# Patient Record
Sex: FEMALE | Employment: FULL TIME | URBAN - METROPOLITAN AREA
[De-identification: names, ages, dates, MRNs, and addresses within clinical notes are randomized per-mention and may not be internally consistent; named-entity substitution may affect disease eponyms.]

---

## 2017-06-05 ENCOUNTER — OFFICE VISIT (OUTPATIENT)
Dept: FAMILY MEDICINE CLINIC | Facility: CLINIC | Age: 47
End: 2017-06-05

## 2017-06-05 VITALS
BODY MASS INDEX: 37.56 KG/M2 | HEART RATE: 69 BPM | TEMPERATURE: 99 F | WEIGHT: 220 LBS | HEIGHT: 64 IN | OXYGEN SATURATION: 99 % | SYSTOLIC BLOOD PRESSURE: 118 MMHG | DIASTOLIC BLOOD PRESSURE: 70 MMHG | RESPIRATION RATE: 14 BRPM

## 2017-06-05 DIAGNOSIS — R21 RASH: Primary | ICD-10-CM

## 2017-06-05 PROCEDURE — 99213 OFFICE O/P EST LOW 20 MIN: CPT | Performed by: FAMILY MEDICINE

## 2017-06-05 RX ORDER — ERYTHROMYCIN 20 MG/G
GEL TOPICAL
Qty: 30 G | Refills: 0 | Status: SHIPPED | OUTPATIENT
Start: 2017-06-05 | End: 2017-08-29 | Stop reason: ALTCHOICE

## 2017-06-05 NOTE — PROGRESS NOTES
HPI:    Patient ID: Stephanie Winter is a 55year old female. HPI  Patient is here for complaint of rash on the right side of her face which she gets off and on. In the past she has tried fluocinolone ointment without relief. It itches a little bit.   Rosa Tello management and plan. No orders of the defined types were placed in this encounter.        Meds This Visit:  Signed Prescriptions Disp Refills    Erythromycin 2 % External Gel 30 g 0      Sig: Apply to area of rash nightly           Imaging & Referrals:  No

## 2017-06-06 ENCOUNTER — TELEPHONE (OUTPATIENT)
Dept: FAMILY MEDICINE CLINIC | Facility: CLINIC | Age: 47
End: 2017-06-06

## 2017-06-06 RX ORDER — BETAMETHASONE DIPROPIONATE 0.5 MG/G
CREAM TOPICAL
Qty: 30 G | Refills: 0 | Status: SHIPPED | OUTPATIENT
Start: 2017-06-06 | End: 2017-06-07

## 2017-06-06 NOTE — TELEPHONE ENCOUNTER
Left detailed message on pt's vm letting her know instructions below. She was asked to Sorin Monzon tomorrow morning when the phones turn on if she has any additional questions.

## 2017-06-06 NOTE — TELEPHONE ENCOUNTER
Please advise  Future Appointments  Date Time Provider Michelle Carly   6/12/2017 4:45 PM Demetrius Abraham MD [facility] [address] [facility]   8/29/2017 1:00 PM Ariane Minaya DO [facility] [address] [facility] [address]

## 2017-06-07 ENCOUNTER — TELEPHONE (OUTPATIENT)
Dept: FAMILY MEDICINE CLINIC | Facility: CLINIC | Age: 47
End: 2017-06-07

## 2017-06-07 RX ORDER — BETAMETHASONE DIPROPIONATE 0.5 MG/G
CREAM TOPICAL
Qty: 30 G | Refills: 0 | Status: SHIPPED | OUTPATIENT
Start: 2017-06-07 | End: 2017-08-29 | Stop reason: ALTCHOICE

## 2017-06-07 NOTE — TELEPHONE ENCOUNTER
Left message on pt's vm asking her to CB to verify which [facility] pharmacy she would like cream to be sent in to.

## 2017-06-07 NOTE — TELEPHONE ENCOUNTER
Mail order pharmacy contacted and they will cancel prescription for betamethasone that was sent incorrectly to mail order

## 2017-06-07 NOTE — TELEPHONE ENCOUNTER
Pt called would like the rx to be sent to [facility] in [facility] [address]. However she asked that we please call and cancel the one at her mail order pharmacy because she will be charged extra.

## 2017-08-29 ENCOUNTER — OFFICE VISIT (OUTPATIENT)
Dept: FAMILY MEDICINE CLINIC | Facility: CLINIC | Age: 47
End: 2017-08-29

## 2017-08-29 VITALS
DIASTOLIC BLOOD PRESSURE: 74 MMHG | TEMPERATURE: 99 F | BODY MASS INDEX: 36.54 KG/M2 | HEART RATE: 76 BPM | SYSTOLIC BLOOD PRESSURE: 118 MMHG | RESPIRATION RATE: 16 BRPM | HEIGHT: 64 IN | WEIGHT: 214 LBS | OXYGEN SATURATION: 98 %

## 2017-08-29 DIAGNOSIS — Z71.82 EXERCISE COUNSELING: ICD-10-CM

## 2017-08-29 DIAGNOSIS — D49.2 NEOPLASM OF SKIN OF FACE: ICD-10-CM

## 2017-08-29 DIAGNOSIS — Z12.39 SCREENING FOR MALIGNANT NEOPLASM OF BREAST: ICD-10-CM

## 2017-08-29 DIAGNOSIS — Z00.00 ROUTINE GENERAL MEDICAL EXAMINATION AT A HEALTH CARE FACILITY: Primary | ICD-10-CM

## 2017-08-29 DIAGNOSIS — Z13.31 DEPRESSION SCREEN: ICD-10-CM

## 2017-08-29 DIAGNOSIS — L60.8 TOENAIL DEFORMITY: ICD-10-CM

## 2017-08-29 DIAGNOSIS — Z71.3 DIETARY COUNSELING: ICD-10-CM

## 2017-08-29 LAB
APPEARANCE: CLEAR
MULTISTIX LOT#: NORMAL NUMERIC
PH, URINE: 7 (ref 4.5–8)
SPECIFIC GRAVITY: 1.02 (ref 1–1.03)
URINE-COLOR: YELLOW
UROBILINOGEN,SEMI-QN: 0.2 MG/DL (ref 0–1.9)

## 2017-08-29 PROCEDURE — 99396 PREV VISIT EST AGE 40-64: CPT | Performed by: FAMILY MEDICINE

## 2017-08-29 PROCEDURE — 81003 URINALYSIS AUTO W/O SCOPE: CPT | Performed by: FAMILY MEDICINE

## 2017-08-29 NOTE — PROGRESS NOTES
HPI:   Tika Castro is a 52year old female who presents for a complete physical exam and breast exam.  Pt feeling well today.  Dentist and eye doctors--no --needs appts;  Diet -healthy and trying harder;    Exercise in past and walker and inherited four UNLISTED      Comment: breast lump removed, benign finding  2000: AUNG NEEDLE LOCALIZATION W/ SPECIMEN 1 SITE RIG*  1995: TUBAL LIGATION   Family History   Problem Relation Age of Onset   • Heart Attack Maternal Grandmother    • Lung Disorder Maternal [place] Warm, dry, pink without rashes; left facial mole on cheek discolored and irregular possible seborrhea; irregular distal left toenail. HEENT: atraumatic, normocephalic,ears, nose, and throat are clear. EYES: PERRLA, EOMI, conjunctiva are clear.   NECK: sup COMP METABOLIC PANEL (14); Future  -     LIPID PANEL; Future  -     TSH+FREE T4; Future  Pt well for annual exam. She was advised pap every three years and annual mammograms, annual dental and eye exams.     Screening for malignant neoplasm of breast  -

## 2017-09-06 ENCOUNTER — LABORATORY ENCOUNTER (OUTPATIENT)
Dept: LAB | Age: 47
End: 2017-09-06
Attending: FAMILY MEDICINE
Payer: COMMERCIAL

## 2017-09-06 ENCOUNTER — HOSPITAL ENCOUNTER (OUTPATIENT)
Dept: MAMMOGRAPHY | Age: 47
Discharge: HOME | End: 2017-09-06
Attending: FAMILY MEDICINE
Payer: COMMERCIAL

## 2017-09-06 DIAGNOSIS — Z12.39 SCREENING FOR MALIGNANT NEOPLASM OF BREAST: ICD-10-CM

## 2017-09-06 DIAGNOSIS — Z00.00 ROUTINE GENERAL MEDICAL EXAMINATION AT A HEALTH CARE FACILITY: ICD-10-CM

## 2017-09-06 LAB
ALBUMIN SERPL-MCNC: 3.7 G/DL (ref 3.5–4.8)
ALP LIVER SERPL-CCNC: 67 U/L (ref 39–100)
ALT SERPL-CCNC: 37 U/L (ref 14–54)
AST SERPL-CCNC: 23 U/L (ref 15–41)
BASOPHILS # BLD AUTO: 0.02 X10(3) UL (ref 0–0.1)
BASOPHILS NFR BLD AUTO: 0.3 %
BILIRUB SERPL-MCNC: 0.5 MG/DL (ref 0.1–2)
BUN BLD-MCNC: 14 MG/DL (ref 8–20)
CALCIUM BLD-MCNC: 9 MG/DL (ref 8.3–10.3)
CHLORIDE: 108 MMOL/L (ref 101–111)
CHOLEST SMN-MCNC: 174 MG/DL (ref ?–200)
CO2: 25 MMOL/L (ref 22–32)
CREAT BLD-MCNC: 0.76 MG/DL (ref 0.55–1.02)
EOSINOPHIL # BLD AUTO: 0.14 X10(3) UL (ref 0–0.3)
EOSINOPHIL NFR BLD AUTO: 2.4 %
ERYTHROCYTE [DISTWIDTH] IN BLOOD BY AUTOMATED COUNT: 13.9 % (ref 11.5–16)
FREE T4: 1 NG/DL (ref 0.9–1.8)
GLUCOSE BLD-MCNC: 95 MG/DL (ref 70–99)
HCT VFR BLD AUTO: 41.9 % (ref 34–50)
HDLC SERPL-MCNC: 74 MG/DL (ref 45–?)
HDLC SERPL: 2.35 {RATIO} (ref ?–4.44)
HGB BLD-MCNC: 13.2 G/DL (ref 12–16)
IMMATURE GRANULOCYTE COUNT: 0.01 X10(3) UL (ref 0–1)
IMMATURE GRANULOCYTE RATIO %: 0.2 %
LDLC SERPL CALC-MCNC: 83 MG/DL (ref ?–130)
LDLC SERPL-MCNC: 17 MG/DL (ref 5–40)
LYMPHOCYTES # BLD AUTO: 2.17 X10(3) UL (ref 0.9–4)
LYMPHOCYTES NFR BLD AUTO: 37.6 %
M PROTEIN MFR SERPL ELPH: 7.5 G/DL (ref 6.1–8.3)
MCH RBC QN AUTO: 29.1 PG (ref 27–33.2)
MCHC RBC AUTO-ENTMCNC: 31.5 G/DL (ref 31–37)
MCV RBC AUTO: 92.3 FL (ref 81–100)
MONOCYTES # BLD AUTO: 0.47 X10(3) UL (ref 0.1–0.6)
MONOCYTES NFR BLD AUTO: 8.1 %
NEUTROPHIL ABS PRELIM: 2.96 X10 (3) UL (ref 1.3–6.7)
NEUTROPHILS # BLD AUTO: 2.96 X10(3) UL (ref 1.3–6.7)
NEUTROPHILS NFR BLD AUTO: 51.4 %
NONHDLC SERPL-MCNC: 100 MG/DL (ref ?–130)
PLATELET # BLD AUTO: 340 10(3)UL (ref 150–450)
POTASSIUM SERPL-SCNC: 4.2 MMOL/L (ref 3.6–5.1)
RBC # BLD AUTO: 4.54 X10(6)UL (ref 3.8–5.1)
RED CELL DISTRIBUTION WIDTH-SD: 47.7 FL (ref 35.1–46.3)
SODIUM SERPL-SCNC: 140 MMOL/L (ref 136–144)
TRIGLYCERIDES: 85 MG/DL (ref ?–150)
TSI SER-ACNC: 1.92 MIU/ML (ref 0.35–5.5)
WBC # BLD AUTO: 5.8 X10(3) UL (ref 4–13)

## 2017-09-06 PROCEDURE — 77067 SCR MAMMO BI INCL CAD: CPT | Performed by: FAMILY MEDICINE

## 2017-09-06 PROCEDURE — 84439 ASSAY OF FREE THYROXINE: CPT | Performed by: FAMILY MEDICINE

## 2017-09-06 PROCEDURE — 80061 LIPID PANEL: CPT | Performed by: FAMILY MEDICINE

## 2017-09-06 PROCEDURE — 36415 COLL VENOUS BLD VENIPUNCTURE: CPT | Performed by: FAMILY MEDICINE

## 2017-09-06 PROCEDURE — 80050 GENERAL HEALTH PANEL: CPT | Performed by: FAMILY MEDICINE

## 2018-07-12 ENCOUNTER — HOSPITAL ENCOUNTER (OUTPATIENT)
Age: 48
Discharge: HOME | End: 2018-07-12
Attending: EMERGENCY MEDICINE
Payer: COMMERCIAL

## 2018-07-12 ENCOUNTER — APPOINTMENT (OUTPATIENT)
Dept: GENERAL RADIOLOGY | Age: 48
End: 2018-07-12
Attending: EMERGENCY MEDICINE
Payer: COMMERCIAL

## 2018-07-12 VITALS
TEMPERATURE: 99 F | OXYGEN SATURATION: 98 % | RESPIRATION RATE: 22 BRPM | DIASTOLIC BLOOD PRESSURE: 73 MMHG | SYSTOLIC BLOOD PRESSURE: 142 MMHG | HEART RATE: 76 BPM

## 2018-07-12 DIAGNOSIS — M65.9 TENOSYNOVITIS OF FINGER AND HAND: Primary | ICD-10-CM

## 2018-07-12 PROCEDURE — 99213 OFFICE O/P EST LOW 20 MIN: CPT

## 2018-07-12 PROCEDURE — 73130 X-RAY EXAM OF HAND: CPT | Performed by: EMERGENCY MEDICINE

## 2018-07-12 PROCEDURE — 29130 APPL FINGER SPLINT STATIC: CPT

## 2018-07-12 RX ORDER — HYDROCODONE BITARTRATE AND ACETAMINOPHEN 5; 325 MG/1; MG/1
1-2 TABLET ORAL EVERY 6 HOURS PRN
Qty: 20 TABLET | Refills: 0 | Status: SHIPPED | OUTPATIENT
Start: 2018-07-12 | End: 2018-07-22

## 2018-07-12 RX ORDER — CEPHALEXIN 500 MG/1
500 CAPSULE ORAL 4 TIMES DAILY
Qty: 28 CAPSULE | Refills: 0 | Status: SHIPPED | OUTPATIENT
Start: 2018-07-12 | End: 2018-07-19

## 2018-07-12 RX ORDER — IBUPROFEN 600 MG/1
600 TABLET ORAL ONCE
Status: COMPLETED | OUTPATIENT
Start: 2018-07-12 | End: 2018-07-12

## 2018-07-12 NOTE — ED PROVIDER NOTES
Patient Seen in: [facility]    History   Patient presents with:  Swelling    Stated Complaint: lt hand & lt wrist swellingj & moving up forearm no injury    HPI    Patient complains of pain in the left hand that started yesterda Packs/day: 0.00      Years: 0.00         Types: Cigarettes     Quit date: 2/23/2010  Smokeless tobacco: Never Used                      Alcohol use:  Yes              Comment: 1-2 per month      Review of Systems    Po swelling involving the 2nd and 3rd digits noted.  No acute disease. I recommend splinting in position of comfort with strict elevation to level of the heart. I recommend NSAIDs around-the-clock and patient was given ibuprofen here.   I will provide a pr

## 2020-08-03 NOTE — PATIENT INSTRUCTIONS
Treating Anxiety Disorders with Medicine  An anxiety disorder can make you feel nervous or apprehensive, even without a clear reason.  In people age 72 and older, generalized anxiety disorder is one of the most commonly diagnosed anxiety disorders. Many t Never use alcohol or other drugs with anti-anxiety medicines. This could result in loss of muscular control, sedation, coma, or death. Also, use only the amount of medicine prescribed for you.  If you think you may have taken too much, get emergency care ri · Addiction. If Alecia People never had a problem with drugs or alcohol, you may not have a problem with medicines used to treat anxiety disorders. But always discuss the medicines with your healthcare provider before taking them.  If you have a history of addicti

## 2020-08-03 NOTE — PROGRESS NOTES
Patient presents with: Anxiety  Weight Problem: causing alot of fatigued, states she missed a couple of periods      HPI:  66-year-old patient here to discuss her history of anxiety.   She has had anxiety for several years but has not had it addressed at a Respiratory: Negative for cough, chest tightness, shortness of breath and wheezing. Cardiovascular: Negative for chest pain, palpitations and leg swelling.    Gastrointestinal: Negative for nausea, vomiting, abdominal pain, diarrhea, blood in stool and a • Lung Disorder Maternal Grandfather    • Stroke Paternal Grandmother    • Psychiatric Paternal Grandfather    • Other (Kidney stones) Brother    • Other (Kidney stones) Other         uncles   • Allergies Sister         hayfever     Social History    Jeni Abdominal: Soft. Bowel sounds are normal. Non tender, no masses, no organomegaly or hernias. Musculoskeletal: Normal range of motion. Psychiatric: Normal mood and affect.  No SI or HI     A/P:    Screening mammogram, encounter for  Laboratory examination Medication should only be used if patient has at least eight hours that will be able to be dedicated to sleep. Patient understands and agrees with the above plan. - traZODone HCl 50 MG Oral Tab;  Take 1 tablet (50 mg total) by mouth nightly as needed for Certain medicines may be prescribed to help control your symptoms. So you may feel less anxious. You may also feel able to move forward with therapy. At first, medicines and dosages may need to be adjusted to find what works best for you.  Try to be patient · Medicines are often taken for 6 to 12 months. Your healthcare provider will then evaluate whether you need to stay on them. Many people who have also had therapy may no longer need medicine to manage anxiety.   · You may need to stop taking medicine slowl All questions were answered and the patient understands the plan.

## 2020-08-04 ENCOUNTER — TELEPHONE (OUTPATIENT)
Dept: FAMILY MEDICINE CLINIC | Facility: CLINIC | Age: 50
End: 2020-08-04

## 2020-08-04 NOTE — TELEPHONE ENCOUNTER
Patient calling, will like prescriptions sent to [facility] in [place]. Original prescription was sent to mail order, patient canceled. Wants to  from local pharmacy. Not sure if will be taking Fluoxetine, needs Trazodone prescription.

## 2020-08-05 RX ORDER — FLUOXETINE 20 MG/1
20 TABLET, FILM COATED ORAL DAILY
Qty: 30 TABLET | Refills: 0 | Status: SHIPPED | OUTPATIENT
Start: 2020-08-05 | End: 2020-08-12

## 2020-08-05 RX ORDER — TRAZODONE HYDROCHLORIDE 50 MG/1
50 TABLET ORAL NIGHTLY PRN
Qty: 30 TABLET | Refills: 0 | Status: SHIPPED | OUTPATIENT
Start: 2020-08-05 | End: 2020-08-12

## 2020-08-12 ENCOUNTER — OFFICE VISIT (OUTPATIENT)
Dept: FAMILY MEDICINE CLINIC | Facility: CLINIC | Age: 50
End: 2020-08-12
Payer: COMMERCIAL

## 2020-08-12 VITALS
WEIGHT: 209.38 LBS | HEIGHT: 64 IN | DIASTOLIC BLOOD PRESSURE: 70 MMHG | TEMPERATURE: 98 F | HEART RATE: 72 BPM | RESPIRATION RATE: 16 BRPM | BODY MASS INDEX: 35.74 KG/M2 | SYSTOLIC BLOOD PRESSURE: 130 MMHG

## 2020-08-12 DIAGNOSIS — Z00.00 ANNUAL PHYSICAL EXAM: Primary | ICD-10-CM

## 2020-08-12 DIAGNOSIS — Z12.4 PAP SMEAR FOR CERVICAL CANCER SCREENING: ICD-10-CM

## 2020-08-12 DIAGNOSIS — Z12.11 COLON CANCER SCREENING: ICD-10-CM

## 2020-08-12 DIAGNOSIS — Z11.3 SCREENING EXAMINATION FOR STD (SEXUALLY TRANSMITTED DISEASE): ICD-10-CM

## 2020-08-12 DIAGNOSIS — Z11.51 ENCOUNTER FOR SCREENING FOR HUMAN PAPILLOMAVIRUS (HPV): ICD-10-CM

## 2020-08-12 DIAGNOSIS — Z13.31 NEGATIVE DEPRESSION SCREENING: ICD-10-CM

## 2020-08-12 PROBLEM — E66.812 CLASS 2 OBESITY DUE TO EXCESS CALORIES WITHOUT SERIOUS COMORBIDITY IN ADULT: Status: ACTIVE | Noted: 2020-08-12

## 2020-08-12 PROBLEM — E66.09 OBESITY DUE TO EXCESS CALORIES: Status: ACTIVE | Noted: 2020-08-12

## 2020-08-12 PROBLEM — E66.09 CLASS 2 OBESITY DUE TO EXCESS CALORIES WITHOUT SERIOUS COMORBIDITY IN ADULT: Status: ACTIVE | Noted: 2020-08-12

## 2020-08-12 PROCEDURE — 88175 CYTOPATH C/V AUTO FLUID REDO: CPT | Performed by: FAMILY MEDICINE

## 2020-08-12 PROCEDURE — 3075F SYST BP GE 130 - 139MM HG: CPT | Performed by: FAMILY MEDICINE

## 2020-08-12 PROCEDURE — 99396 PREV VISIT EST AGE 40-64: CPT | Performed by: FAMILY MEDICINE

## 2020-08-12 PROCEDURE — 87624 HPV HI-RISK TYP POOLED RSLT: CPT | Performed by: FAMILY MEDICINE

## 2020-08-12 PROCEDURE — 3078F DIAST BP <80 MM HG: CPT | Performed by: FAMILY MEDICINE

## 2020-08-12 PROCEDURE — 87591 N.GONORRHOEAE DNA AMP PROB: CPT | Performed by: FAMILY MEDICINE

## 2020-08-12 PROCEDURE — 3008F BODY MASS INDEX DOCD: CPT | Performed by: FAMILY MEDICINE

## 2020-08-12 PROCEDURE — 87491 CHLMYD TRACH DNA AMP PROBE: CPT | Performed by: FAMILY MEDICINE

## 2020-08-12 NOTE — PATIENT INSTRUCTIONS
Central Scheduling [phone] to schedule labs and mammogram        Thank you for allowing me to participate in your care today. I will contact you with any results from today's visit.   Lab results are typically available in 2-3 days for blood tests, you decide when to start screening. At age 39 start yearly mammograms. 3    Cervical cancer All women in this age group, except women who have had a complete hysterectomy Pap test every 3 years or Pap test plus human papilloma virus (HPV) test every 5 years after the first dose   Haemophilus influenzae Type B (HIB) Women at increased risk 1 to 3 doses   Influenza (flu) All women in this age group Once a year   Measles, mumps, rubella (MMR) All women in this age group who have no record of these infections or

## 2020-08-12 NOTE — PROGRESS NOTES
Ame Gutiérrez is a 52year old female that presents for annual physical exam.     Last Pap: Pap Smear,3 Years due on 08/17/2019 - Due  Hx of abnormal pap: Yes, last pap ASCUS   STI testing desired: Yes, has been monogamous,  for 17 years  Bethany Appiah Stress reaction 2012   • Vitamin D deficiency 9/3/2013     Past Surgical History:   Procedure Laterality Date   • Anesthesia for;  procedures     • Breast surgery procedure unlisted      breast lump removed, benign finding   • Verito loc file        Attends meetings of clubs or organizations: Not on file        Relationship status: Not on file      Intimate partner violence:        Fear of current or ex partner: Not on file        Emotionally abused: Not on file        Physically abused: N adenopathy, no thyromegaly or palpable nodules  CHEST: no chest tenderness  LUNGS: clear to auscultation  CARDIO: RRR without murmur  GI: +bowel sounds, no masses, HSM or tenderness  MUSCULOSKELETAL: back is not tender  EXTREMITIES: no cyanosis, clubbing o

## 2020-08-13 LAB
C TRACH DNA SPEC QL NAA+PROBE: NEGATIVE
HPV I/H RISK 1 DNA SPEC QL NAA+PROBE: NEGATIVE
N GONORRHOEA DNA SPEC QL NAA+PROBE: NEGATIVE

## 2020-08-31 ENCOUNTER — LAB ENCOUNTER (OUTPATIENT)
Dept: LAB | Age: 50
End: 2020-08-31
Attending: FAMILY MEDICINE
Payer: COMMERCIAL

## 2020-08-31 DIAGNOSIS — Z00.00 LABORATORY EXAMINATION ORDERED AS PART OF A COMPLETE PHYSICAL EXAMINATION: ICD-10-CM

## 2020-08-31 LAB
ALBUMIN SERPL-MCNC: 3.2 G/DL (ref 3.4–5)
ALBUMIN/GLOB SERPL: 1 {RATIO} (ref 1–2)
ALP LIVER SERPL-CCNC: 60 U/L (ref 39–100)
ALT SERPL-CCNC: 22 U/L (ref 13–56)
ANION GAP SERPL CALC-SCNC: 5 MMOL/L (ref 0–18)
AST SERPL-CCNC: 15 U/L (ref 15–37)
BASOPHILS # BLD AUTO: 0.02 X10(3) UL (ref 0–0.2)
BASOPHILS NFR BLD AUTO: 0.4 %
BILIRUB SERPL-MCNC: 0.4 MG/DL (ref 0.1–2)
BUN BLD-MCNC: 15 MG/DL (ref 7–18)
BUN/CREAT SERPL: 20.8 (ref 10–20)
CALCIUM BLD-MCNC: 8.2 MG/DL (ref 8.5–10.1)
CHLORIDE SERPL-SCNC: 106 MMOL/L (ref 98–112)
CHOLEST SMN-MCNC: 148 MG/DL (ref ?–200)
CO2 SERPL-SCNC: 28 MMOL/L (ref 21–32)
CREAT BLD-MCNC: 0.72 MG/DL (ref 0.55–1.02)
DEPRECATED RDW RBC AUTO: 52.6 FL (ref 35.1–46.3)
EOSINOPHIL # BLD AUTO: 0.16 X10(3) UL (ref 0–0.7)
EOSINOPHIL NFR BLD AUTO: 3 %
ERYTHROCYTE [DISTWIDTH] IN BLOOD BY AUTOMATED COUNT: 14.5 % (ref 11–15)
EST. AVERAGE GLUCOSE BLD GHB EST-MCNC: 117 MG/DL (ref 68–126)
GLOBULIN PLAS-MCNC: 3.2 G/DL (ref 2.8–4.4)
GLUCOSE BLD-MCNC: 95 MG/DL (ref 70–99)
HBA1C MFR BLD HPLC: 5.7 % (ref ?–5.7)
HCT VFR BLD AUTO: 40.7 % (ref 35–48)
HDLC SERPL-MCNC: 68 MG/DL (ref 40–59)
HGB BLD-MCNC: 12.2 G/DL (ref 12–16)
IMM GRANULOCYTES # BLD AUTO: 0.01 X10(3) UL (ref 0–1)
IMM GRANULOCYTES NFR BLD: 0.2 %
LDLC SERPL CALC-MCNC: 67 MG/DL (ref ?–100)
LYMPHOCYTES # BLD AUTO: 2.15 X10(3) UL (ref 1–4)
LYMPHOCYTES NFR BLD AUTO: 39.8 %
M PROTEIN MFR SERPL ELPH: 6.4 G/DL (ref 6.4–8.2)
MCH RBC QN AUTO: 29.5 PG (ref 26–34)
MCHC RBC AUTO-ENTMCNC: 30 G/DL (ref 31–37)
MCV RBC AUTO: 98.5 FL (ref 80–100)
MONOCYTES # BLD AUTO: 0.44 X10(3) UL (ref 0.1–1)
MONOCYTES NFR BLD AUTO: 8.1 %
NEUTROPHILS # BLD AUTO: 2.62 X10 (3) UL (ref 1.5–7.7)
NEUTROPHILS # BLD AUTO: 2.62 X10(3) UL (ref 1.5–7.7)
NEUTROPHILS NFR BLD AUTO: 48.5 %
NONHDLC SERPL-MCNC: 80 MG/DL (ref ?–130)
OSMOLALITY SERPL CALC.SUM OF ELEC: 289 MOSM/KG (ref 275–295)
PATIENT FASTING Y/N/NP: YES
PATIENT FASTING Y/N/NP: YES
PLATELET # BLD AUTO: 311 10(3)UL (ref 150–450)
POTASSIUM SERPL-SCNC: 4.2 MMOL/L (ref 3.5–5.1)
RBC # BLD AUTO: 4.13 X10(6)UL (ref 3.8–5.3)
SODIUM SERPL-SCNC: 139 MMOL/L (ref 136–145)
TRIGL SERPL-MCNC: 64 MG/DL (ref 30–149)
TSI SER-ACNC: 2.33 MIU/ML (ref 0.36–3.74)
VIT D+METAB SERPL-MCNC: 25.1 NG/ML (ref 30–100)
VLDLC SERPL CALC-MCNC: 13 MG/DL (ref 0–30)
WBC # BLD AUTO: 5.4 X10(3) UL (ref 4–11)

## 2020-08-31 PROCEDURE — 36415 COLL VENOUS BLD VENIPUNCTURE: CPT | Performed by: FAMILY MEDICINE

## 2020-08-31 PROCEDURE — 80050 GENERAL HEALTH PANEL: CPT | Performed by: FAMILY MEDICINE

## 2020-08-31 PROCEDURE — 80061 LIPID PANEL: CPT | Performed by: FAMILY MEDICINE

## 2020-08-31 PROCEDURE — 83036 HEMOGLOBIN GLYCOSYLATED A1C: CPT | Performed by: FAMILY MEDICINE

## 2020-08-31 PROCEDURE — 82306 VITAMIN D 25 HYDROXY: CPT | Performed by: FAMILY MEDICINE

## 2020-09-01 ENCOUNTER — HOSPITAL ENCOUNTER (OUTPATIENT)
Dept: MAMMOGRAPHY | Age: 50
Discharge: HOME | End: 2020-09-01
Attending: FAMILY MEDICINE
Payer: COMMERCIAL

## 2020-09-01 DIAGNOSIS — Z12.31 SCREENING MAMMOGRAM, ENCOUNTER FOR: ICD-10-CM

## 2020-09-01 PROCEDURE — 77063 BREAST TOMOSYNTHESIS BI: CPT | Performed by: FAMILY MEDICINE

## 2020-09-01 PROCEDURE — 77067 SCR MAMMO BI INCL CAD: CPT | Performed by: FAMILY MEDICINE

## 2020-09-04 ENCOUNTER — PATIENT MESSAGE (OUTPATIENT)
Dept: FAMILY MEDICINE CLINIC | Facility: CLINIC | Age: 50
End: 2020-09-04

## 2020-09-04 PROBLEM — R73.03 PREDIABETES: Status: ACTIVE | Noted: 2020-09-04

## 2020-09-04 NOTE — TELEPHONE ENCOUNTER
From: Luther Bernal  To:  Arnaldo Reid DO  Sent: 9/4/2020 10:38 AM CDT  Subject: Test Results Question    I have a question about [facility] KYARA 2D+3D SCREENING BILAT (CPT=77067/95175) resulted on 9/1/20, 7:20 PM.    What does the \"dense\" finding indicate

## 2020-09-24 RX ORDER — TRAZODONE HYDROCHLORIDE 50 MG/1
50 TABLET ORAL NIGHTLY PRN
Qty: 30 TABLET | Refills: 0 | OUTPATIENT
Start: 2020-09-24 | End: 2020-10-24

## 2020-09-24 NOTE — TELEPHONE ENCOUNTER
Name from pharmacy: TRAZODONE 50 MG TABLET         Will file in chart as: TRAZODONE HCL 50 MG Oral Tab    The original prescription was discontinued on 8/12/2020 by Ej Staley DO for the following reason: Patient refused.  Renewing this prescription

## 2020-11-04 ENCOUNTER — TELEPHONE (OUTPATIENT)
Dept: FAMILY MEDICINE CLINIC | Facility: CLINIC | Age: 50
End: 2020-11-04

## 2020-11-04 NOTE — TELEPHONE ENCOUNTER
Called pt and offered either 9 am of 5:30 pm, pt asked for 9 am, appointment scheduled.    Future Appointments   Date Time Provider Michelle Carroll   11/5/2020  9:00 AM Melchor Kruger,  [facility] [address] [facility]   11/14/2020  9:45 AM PF COVID RESOURCE PF OU

## 2020-11-04 NOTE — TELEPHONE ENCOUNTER
Tomorrow first thing in AM or end of day is fine if she wants.     Abdirashid Gonzalez, DO  Family Medicine

## 2020-11-04 NOTE — TELEPHONE ENCOUNTER
- Pt is scheduled for ov as she may have a UTI. Is there anything she can do until being seen?     Future Appointments   Date Time Provider Michelle Carroll   11/6/2020  8:00 AM Broton, Candance Calkin,  [facility] [address]   11/14/2020  9:45 AM PF CO

## 2020-11-04 NOTE — TELEPHONE ENCOUNTER
See TE, the only opening you have is the 5:30 virtual appointment, would you like me to  her there or do you want to fit her in somewhere else?

## 2020-11-05 ENCOUNTER — OFFICE VISIT (OUTPATIENT)
Dept: FAMILY MEDICINE CLINIC | Facility: CLINIC | Age: 50
End: 2020-11-05
Payer: COMMERCIAL

## 2020-11-05 VITALS
TEMPERATURE: 98 F | DIASTOLIC BLOOD PRESSURE: 70 MMHG | HEIGHT: 64 IN | SYSTOLIC BLOOD PRESSURE: 110 MMHG | RESPIRATION RATE: 16 BRPM | WEIGHT: 210 LBS | BODY MASS INDEX: 35.85 KG/M2 | HEART RATE: 70 BPM | OXYGEN SATURATION: 97 %

## 2020-11-05 DIAGNOSIS — R30.0 DYSURIA: Primary | ICD-10-CM

## 2020-11-05 PROCEDURE — 3078F DIAST BP <80 MM HG: CPT | Performed by: FAMILY MEDICINE

## 2020-11-05 PROCEDURE — 99072 ADDL SUPL MATRL&STAF TM PHE: CPT | Performed by: FAMILY MEDICINE

## 2020-11-05 PROCEDURE — 87086 URINE CULTURE/COLONY COUNT: CPT | Performed by: FAMILY MEDICINE

## 2020-11-05 PROCEDURE — 99213 OFFICE O/P EST LOW 20 MIN: CPT | Performed by: FAMILY MEDICINE

## 2020-11-05 PROCEDURE — 3074F SYST BP LT 130 MM HG: CPT | Performed by: FAMILY MEDICINE

## 2020-11-05 PROCEDURE — 3008F BODY MASS INDEX DOCD: CPT | Performed by: FAMILY MEDICINE

## 2020-11-05 RX ORDER — TRAZODONE HYDROCHLORIDE 50 MG/1
50 TABLET ORAL NIGHTLY PRN
COMMUNITY
Start: 2020-08-28

## 2020-11-05 RX ORDER — NITROFURANTOIN 25; 75 MG/1; MG/1
100 CAPSULE ORAL 2 TIMES DAILY
Qty: 14 CAPSULE | Refills: 0 | Status: SHIPPED | OUTPATIENT
Start: 2020-11-05 | End: 2020-11-12

## 2020-11-05 NOTE — PATIENT INSTRUCTIONS
Bladder Infection, Female (Adult)     Urine normally doesn't have any germs (bacteria) in it. But bacteria can get into the urinary tract from the skin around the rectum. Or they can travel in the blood from other parts of the body.  Once they are in your · Wiping incorrectly after urinating. Always wipe from front to back. · Bowel incontinence  · Pregnancy  · Procedures such as having a catheter put in  · Older age  · Not emptying your bladder. This can give bacteria a chance to grow in your urine.   · Flu · Improve your diet and prevent constipation. Eat more fresh fruits and vegetables, and fiber. Eat less junk foods and fatty foods. · Don't have sex until your symptoms are gone. · Don't have caffeine, alcohol, and spicy foods.  These can irritate your bl

## 2020-11-14 ENCOUNTER — APPOINTMENT (OUTPATIENT)
Dept: LAB | Age: 50
End: 2020-11-14
Attending: INTERNAL MEDICINE
Payer: COMMERCIAL

## 2020-11-14 DIAGNOSIS — Z01.818 PRE-OP TESTING: ICD-10-CM

## 2020-11-17 PROBLEM — D12.9 BENIGN NEOPLASM OF RECTUM AND ANAL CANAL: Status: ACTIVE | Noted: 2020-11-17

## 2020-11-17 PROBLEM — Z12.11 SPECIAL SCREENING FOR MALIGNANT NEOPLASM OF COLON: Status: ACTIVE | Noted: 2020-11-17

## 2020-11-17 PROBLEM — D12.8 BENIGN NEOPLASM OF RECTUM AND ANAL CANAL: Status: ACTIVE | Noted: 2020-11-17

## 2021-01-11 ENCOUNTER — LAB ENCOUNTER (OUTPATIENT)
Dept: LAB | Age: 51
End: 2021-01-11
Attending: INTERNAL MEDICINE
Payer: COMMERCIAL

## 2021-01-11 DIAGNOSIS — Z01.818 PREOP TESTING: ICD-10-CM

## 2021-01-12 LAB — SARS-COV-2 RNA RESP QL NAA+PROBE: NOT DETECTED

## 2021-01-14 PROBLEM — K21.9 GERD (GASTROESOPHAGEAL REFLUX DISEASE): Status: ACTIVE | Noted: 2021-01-14

## 2021-09-23 ENCOUNTER — TELEPHONE (OUTPATIENT)
Dept: FAMILY MEDICINE CLINIC | Facility: CLINIC | Age: 51
End: 2021-09-23

## 2021-09-23 DIAGNOSIS — Z00.00 LABORATORY EXAMINATION ORDERED AS PART OF A COMPLETE PHYSICAL EXAMINATION: ICD-10-CM

## 2021-09-23 DIAGNOSIS — R73.03 PREDIABETES: Primary | ICD-10-CM

## 2021-09-23 NOTE — TELEPHONE ENCOUNTER
Future Appointments   Date Time Provider Michelle Carroll   9/28/2021  6:30 PM Sarbjit Kruger, DO [facility] [address]     Patient has been notified via [employer] reminder her to have her labs done 1-2 days prior to appt. Labs pended for review.

## 2021-09-27 ENCOUNTER — PATIENT MESSAGE (OUTPATIENT)
Dept: FAMILY MEDICINE CLINIC | Facility: CLINIC | Age: 51
End: 2021-09-27

## 2021-09-27 NOTE — TELEPHONE ENCOUNTER
From: Karyle Hilda ToLucius Haggard  Sent: 9/23/2021 2:22 PM CDT  Subject: lab testing    Christopher Mei,    Upon reviewing you chart, we noticed you are due for blood work. Dr. Roxanne Mtz placed lab orders for your upcoming appointment.  Please have them done 1-2

## 2021-09-28 ENCOUNTER — OFFICE VISIT (OUTPATIENT)
Dept: FAMILY MEDICINE CLINIC | Facility: CLINIC | Age: 51
End: 2021-09-28
Payer: COMMERCIAL

## 2021-09-28 VITALS
HEIGHT: 64 IN | SYSTOLIC BLOOD PRESSURE: 120 MMHG | OXYGEN SATURATION: 99 % | HEART RATE: 66 BPM | WEIGHT: 217.13 LBS | DIASTOLIC BLOOD PRESSURE: 70 MMHG | BODY MASS INDEX: 37.07 KG/M2 | TEMPERATURE: 97 F | RESPIRATION RATE: 16 BRPM

## 2021-09-28 DIAGNOSIS — G43.709 CHRONIC MIGRAINE WITHOUT AURA WITHOUT STATUS MIGRAINOSUS, NOT INTRACTABLE: ICD-10-CM

## 2021-09-28 DIAGNOSIS — Z00.00 ANNUAL PHYSICAL EXAM: Primary | ICD-10-CM

## 2021-09-28 DIAGNOSIS — E66.09 CLASS 2 OBESITY DUE TO EXCESS CALORIES WITHOUT SERIOUS COMORBIDITY WITH BODY MASS INDEX (BMI) OF 37.0 TO 37.9 IN ADULT: ICD-10-CM

## 2021-09-28 DIAGNOSIS — L71.0 PERIORIFICIAL DERMATITIS: ICD-10-CM

## 2021-09-28 DIAGNOSIS — M25.50 POLYARTHRALGIA: ICD-10-CM

## 2021-09-28 DIAGNOSIS — Z13.31 NEGATIVE DEPRESSION SCREENING: ICD-10-CM

## 2021-09-28 DIAGNOSIS — Z12.31 BREAST CANCER SCREENING BY MAMMOGRAM: ICD-10-CM

## 2021-09-28 PROBLEM — D12.8 BENIGN NEOPLASM OF RECTUM AND ANAL CANAL: Status: RESOLVED | Noted: 2020-11-17 | Resolved: 2021-09-28

## 2021-09-28 PROBLEM — D12.9 BENIGN NEOPLASM OF RECTUM AND ANAL CANAL: Status: RESOLVED | Noted: 2020-11-17 | Resolved: 2021-09-28

## 2021-09-28 PROBLEM — Z12.11 SPECIAL SCREENING FOR MALIGNANT NEOPLASM OF COLON: Status: RESOLVED | Noted: 2020-11-17 | Resolved: 2021-09-28

## 2021-09-28 PROCEDURE — 99396 PREV VISIT EST AGE 40-64: CPT | Performed by: FAMILY MEDICINE

## 2021-09-28 PROCEDURE — 3074F SYST BP LT 130 MM HG: CPT | Performed by: FAMILY MEDICINE

## 2021-09-28 PROCEDURE — 99213 OFFICE O/P EST LOW 20 MIN: CPT | Performed by: FAMILY MEDICINE

## 2021-09-28 PROCEDURE — 3078F DIAST BP <80 MM HG: CPT | Performed by: FAMILY MEDICINE

## 2021-09-28 PROCEDURE — 3008F BODY MASS INDEX DOCD: CPT | Performed by: FAMILY MEDICINE

## 2021-09-28 RX ORDER — MINOCYCLINE HYDROCHLORIDE 100 MG/1
100 CAPSULE ORAL 2 TIMES DAILY
Qty: 30 CAPSULE | Refills: 0 | Status: SHIPPED | OUTPATIENT
Start: 2021-09-28

## 2021-09-28 RX ORDER — ELETRIPTAN HYDROBROMIDE 20 MG/1
20 TABLET, FILM COATED ORAL AS NEEDED
Qty: 9 TABLET | Refills: 0 | Status: SHIPPED | OUTPATIENT
Start: 2021-09-28

## 2021-09-28 RX ORDER — MINOCYCLINE HYDROCHLORIDE 100 MG/1
100 CAPSULE ORAL 2 TIMES DAILY
COMMUNITY
End: 2021-09-28

## 2021-09-28 NOTE — PROGRESS NOTES
Mina Dave is a 46year old female that presents for annual physical exam.     Patient presents with:  Physical: PHQ2: 0, CSSR: Neg  Medication Follow-Up: needs refills om relpax and minocycline  Immunization/Injection: declined flu vaccine today tablet by mouth daily. , Disp: , Rfl:   •  Multiple Vitamin (MULTI-VITAMIN DAILY OR), Take  by mouth., Disp: , Rfl:   •  Linolenic Acid (OMEGA 3 OR), Take  by mouth., Disp: , Rfl:     Past Medical History:   Diagnosis Date   • Abdominal wall lump 9/3/2013 Cancer Maternal Aunt    • Breast Cancer Neg    • Colon Cancer Neg    • Ovarian Cancer Neg        Social History    Socioeconomic History      Marital status:       Spouse name: Not on file      Number of children: Not on file      Years of educatio and Family: Not on file      Attends Islam Services: Not on file      Active Member of Clubs or Organizations: Not on file      Attends Club or Organization Meetings: Not on file      Marital Status: Not on file  Intimate Partner Violence:       Fear o HSM or tenderness  MUSCULOSKELETAL: back is not tender  EXTREMITIES: no cyanosis, clubbing or LE edema, no joint swelling or tenderness on exam   NEURO: Oriented times three, gait stable, motor and sensory are grossly intact      Wt Readings from Last 6 En

## 2021-09-28 NOTE — PATIENT INSTRUCTIONS
Thank you for allowing me to participate in your care today. I will contact you with any results from today's visit. Lab results are typically available in 2-3 days for blood tests, and 3-5 days for any cultures or Paps.    Please let me know if you hav familiar with the potential benefits and risks of breast cancer screening with mammograms.      Cervical cancer All women in this age group, except women who have had a complete hysterectomy Pap test every 3 years or Pap test with human papillomavirus (HPV your healthcare provider 2 doses given at least 6 months apart   Hepatitis B Women at increased risk for infection – talk with your healthcare provider 3 doses over 6 months; second dose should be given 1 month after the first dose; the third dose should b at risk for cardiovascular health problems such as stroke When your risk is known   Use of tobacco and the health effects it can cause All women in this age group Every exam   [address]  Date Last Reviewed: 1/26/2016  © 5041-8529 [employer]

## 2021-09-29 ENCOUNTER — LAB ENCOUNTER (OUTPATIENT)
Dept: LAB | Age: 51
End: 2021-09-29
Attending: FAMILY MEDICINE
Payer: COMMERCIAL

## 2021-09-29 DIAGNOSIS — M25.50 POLYARTHRALGIA: ICD-10-CM

## 2021-09-29 DIAGNOSIS — Z00.00 LABORATORY EXAMINATION ORDERED AS PART OF A COMPLETE PHYSICAL EXAMINATION: ICD-10-CM

## 2021-09-29 DIAGNOSIS — R73.03 PREDIABETES: ICD-10-CM

## 2021-09-29 PROCEDURE — 80050 GENERAL HEALTH PANEL: CPT | Performed by: FAMILY MEDICINE

## 2021-09-29 PROCEDURE — 83036 HEMOGLOBIN GLYCOSYLATED A1C: CPT | Performed by: FAMILY MEDICINE

## 2021-09-29 PROCEDURE — 80061 LIPID PANEL: CPT | Performed by: FAMILY MEDICINE

## 2021-09-29 PROCEDURE — 86038 ANTINUCLEAR ANTIBODIES: CPT | Performed by: FAMILY MEDICINE

## 2021-09-30 ENCOUNTER — HOSPITAL ENCOUNTER (OUTPATIENT)
Dept: MAMMOGRAPHY | Facility: HOSPITAL | Age: 51
Discharge: HOME | End: 2021-09-30
Attending: FAMILY MEDICINE
Payer: COMMERCIAL

## 2021-09-30 DIAGNOSIS — Z12.31 ENCOUNTER FOR SCREENING MAMMOGRAM FOR MALIGNANT NEOPLASM OF BREAST: ICD-10-CM

## 2021-09-30 DIAGNOSIS — Z12.31 BREAST CANCER SCREENING BY MAMMOGRAM: ICD-10-CM

## 2021-09-30 PROCEDURE — 77063 BREAST TOMOSYNTHESIS BI: CPT | Performed by: FAMILY MEDICINE

## 2021-09-30 PROCEDURE — 77067 SCR MAMMO BI INCL CAD: CPT | Performed by: FAMILY MEDICINE

## 2021-10-13 ENCOUNTER — TELEPHONE (OUTPATIENT)
Dept: ADMINISTRATIVE | Age: 51
End: 2021-10-13

## 2021-10-28 ENCOUNTER — OFFICE VISIT (OUTPATIENT)
Dept: INTERNAL MEDICINE CLINIC | Facility: CLINIC | Age: 51
End: 2021-10-28
Payer: COMMERCIAL

## 2021-10-28 VITALS
HEART RATE: 74 BPM | WEIGHT: 220 LBS | BODY MASS INDEX: 37.56 KG/M2 | RESPIRATION RATE: 16 BRPM | SYSTOLIC BLOOD PRESSURE: 122 MMHG | DIASTOLIC BLOOD PRESSURE: 74 MMHG | HEIGHT: 64 IN

## 2021-10-28 DIAGNOSIS — K21.9 GASTROESOPHAGEAL REFLUX DISEASE, UNSPECIFIED WHETHER ESOPHAGITIS PRESENT: ICD-10-CM

## 2021-10-28 DIAGNOSIS — E55.9 VITAMIN D DEFICIENCY: ICD-10-CM

## 2021-10-28 DIAGNOSIS — Z51.81 ENCOUNTER FOR THERAPEUTIC DRUG LEVEL MONITORING: Primary | ICD-10-CM

## 2021-10-28 DIAGNOSIS — E78.2 MIXED HYPERLIPIDEMIA: ICD-10-CM

## 2021-10-28 DIAGNOSIS — E66.01 CLASS 2 SEVERE OBESITY WITH SERIOUS COMORBIDITY AND BODY MASS INDEX (BMI) OF 37.0 TO 37.9 IN ADULT, UNSPECIFIED OBESITY TYPE (HCC): ICD-10-CM

## 2021-10-28 DIAGNOSIS — R73.03 PREDIABETES: ICD-10-CM

## 2021-10-28 PROCEDURE — 3008F BODY MASS INDEX DOCD: CPT | Performed by: PHYSICIAN ASSISTANT

## 2021-10-28 PROCEDURE — 3074F SYST BP LT 130 MM HG: CPT | Performed by: PHYSICIAN ASSISTANT

## 2021-10-28 PROCEDURE — 99204 OFFICE O/P NEW MOD 45 MIN: CPT | Performed by: PHYSICIAN ASSISTANT

## 2021-10-28 PROCEDURE — 3078F DIAST BP <80 MM HG: CPT | Performed by: PHYSICIAN ASSISTANT

## 2021-10-28 NOTE — PROGRESS NOTES
HISTORY OF PRESENT ILLNESS  Patient presents with:  Weight Problem: referred by pcp, no previous meds       Amber Murcia is a 46year old female new to our office today for initiation of medical weight loss program.  Patient presents today with c/o exce interrupted, has improved from last year, but still waking in the night    MEDICAL HISTORY  PMH reviewed:   Cardiac disorders:negative    Depression/anxiety: Yes - has been on medications, but never long term, has seen a therapist in the past  Glaucoma: ne UE/LE  PSYCH: pleasant, cooperative, normal mood and affect    Lab Results   Component Value Date    WBC 6.3 09/29/2021    RBC 4.77 09/29/2021    HGB 13.6 09/29/2021    HCT 44.1 09/29/2021    MCV 92.5 09/29/2021    MCH 28.5 09/29/2021    MCHC 30.8 (L) 09/2 mouth., Disp: , Rfl:     No current facility-administered medications on file prior to visit.       ASSESSMENT  Initial Weight Data and Goal Weight Loss:       Diagnoses and all orders for this visit:    Encounter for therapeutic drug level monitoring  - pre-charting, obtaining history, counseling, and educating, reviewing labs was 45 minutes.       Patient Instructions     1400 calories  Moderate Carb (30/35/35)   106g   protein     55g   fats     124g   carbs   Lower Carb (40/40/20)   142g   protein     6 Pal-->When you set-up the erick or need to adjust settings:                Goals should include:                  Lose 1.5-2 lbs per week                Activity level: not very active (can't count exercise towards calorie number per day)                   ** and veggies- Dip baby carrots, pepper strips or snap peas in ¼ cup of hummus  Nuts- Munch on 1 ounce of any kind of nut (about ¼ cup)  Wrap it up- Wrap 2 ounces of low sodium, nitrate free turkey around red pepper or cucumber slices  Yogurt and berries- 1/ Personal Training with Gavino Foster at our [facility]- individual weekly coaching with option to add personal training and small group fitness classes targeted at weight loss- [phone] and/or email @ [email or web address]  · 360SHAWNA Bedolla messages sent to your phone    Books/Video Education/Podcasts  · Mindless Eating by Glenna Peters  · Why We Get Sick by Aranza Gamble (a book about insulin resistance)  · Atomic Habits by Robina Jo (a book about taking small steps to promote greater be

## 2021-10-28 NOTE — PATIENT INSTRUCTIONS
1400 calories  Moderate Carb (30/35/35)   106g   protein     55g   fats     124g   carbs   Lower Carb (40/40/20)   142g   protein     63g   fats     71g   carbs     We are here to support you with weight loss, but please remember that you still need your p 1.5-2 lbs per week                Activity level: not very active (can't count exercise towards calorie number per day)                   ** Daily INPUT> Look at nutrition section-- \"nutrients\" and it will break down your macros for the day (ie.  Protein, nut (about ¼ cup)  Wrap it up- Wrap 2 ounces of low sodium, nitrate free turkey around red pepper or cucumber slices  Yogurt and berries- 1/2 cup berries on a 6 ounce container of plain or low sugar fruit flavored 2% [place] yogurt (less than 15 grams sugar training and small group fitness classes targeted at weight loss- [phone] and/or email @ [email or web address]  · 360FIT Rubens @ [email or web address] Group Fitness [phone] and/or email Celsa Rodney at [email or web address]  · F Paras Lopze (a book about insulin resistance)  · Atomic Habits by Sonia Dawson (a book about taking small steps to promote greater behavior change)   · Can't Hurt Me by Jina Carpenter (a book exploring the power of discipline in achieving your goals)  ·

## 2021-12-06 ENCOUNTER — LAB ENCOUNTER (OUTPATIENT)
Dept: LAB | Age: 51
End: 2021-12-06
Attending: PHYSICIAN ASSISTANT
Payer: COMMERCIAL

## 2021-12-06 DIAGNOSIS — K21.9 GASTROESOPHAGEAL REFLUX DISEASE, UNSPECIFIED WHETHER ESOPHAGITIS PRESENT: ICD-10-CM

## 2021-12-06 DIAGNOSIS — E66.01 CLASS 2 SEVERE OBESITY WITH SERIOUS COMORBIDITY AND BODY MASS INDEX (BMI) OF 37.0 TO 37.9 IN ADULT, UNSPECIFIED OBESITY TYPE (HCC): ICD-10-CM

## 2021-12-06 DIAGNOSIS — Z51.81 ENCOUNTER FOR THERAPEUTIC DRUG LEVEL MONITORING: ICD-10-CM

## 2021-12-06 DIAGNOSIS — E55.9 VITAMIN D DEFICIENCY: ICD-10-CM

## 2021-12-06 DIAGNOSIS — R73.03 PREDIABETES: ICD-10-CM

## 2021-12-06 PROCEDURE — 82306 VITAMIN D 25 HYDROXY: CPT

## 2021-12-06 PROCEDURE — 82607 VITAMIN B-12: CPT

## 2021-12-06 PROCEDURE — 36415 COLL VENOUS BLD VENIPUNCTURE: CPT

## 2021-12-08 ENCOUNTER — OFFICE VISIT (OUTPATIENT)
Dept: INTERNAL MEDICINE CLINIC | Facility: CLINIC | Age: 51
End: 2021-12-08
Payer: COMMERCIAL

## 2021-12-08 VITALS
HEART RATE: 76 BPM | WEIGHT: 219 LBS | HEIGHT: 64 IN | BODY MASS INDEX: 37.39 KG/M2 | DIASTOLIC BLOOD PRESSURE: 70 MMHG | SYSTOLIC BLOOD PRESSURE: 122 MMHG | RESPIRATION RATE: 16 BRPM

## 2021-12-08 DIAGNOSIS — R73.03 PREDIABETES: ICD-10-CM

## 2021-12-08 DIAGNOSIS — E66.01 CLASS 2 SEVERE OBESITY WITH SERIOUS COMORBIDITY AND BODY MASS INDEX (BMI) OF 37.0 TO 37.9 IN ADULT, UNSPECIFIED OBESITY TYPE (HCC): ICD-10-CM

## 2021-12-08 DIAGNOSIS — F41.9 ANXIETY AND DEPRESSION: ICD-10-CM

## 2021-12-08 DIAGNOSIS — F43.9 STRESS: ICD-10-CM

## 2021-12-08 DIAGNOSIS — Z51.81 ENCOUNTER FOR THERAPEUTIC DRUG LEVEL MONITORING: Primary | ICD-10-CM

## 2021-12-08 DIAGNOSIS — F32.A ANXIETY AND DEPRESSION: ICD-10-CM

## 2021-12-08 DIAGNOSIS — E78.2 MIXED HYPERLIPIDEMIA: ICD-10-CM

## 2021-12-08 PROCEDURE — 3074F SYST BP LT 130 MM HG: CPT | Performed by: PHYSICIAN ASSISTANT

## 2021-12-08 PROCEDURE — 3078F DIAST BP <80 MM HG: CPT | Performed by: PHYSICIAN ASSISTANT

## 2021-12-08 PROCEDURE — 3008F BODY MASS INDEX DOCD: CPT | Performed by: PHYSICIAN ASSISTANT

## 2021-12-08 PROCEDURE — 99214 OFFICE O/P EST MOD 30 MIN: CPT | Performed by: PHYSICIAN ASSISTANT

## 2021-12-08 NOTE — PROGRESS NOTES
HISTORY OF PRESENT ILLNESS  Patient presents with:  Weight Check: down 1 lb      Katy Zamarripa is a 46year old female here for follow up in medical weight loss program.   Down 1lb  Not on AOM  Denies chest pain, shortness of breath, dizziness, blurred v Lab Results   Component Value Date    GLU 95 09/29/2021    BUN 20 (H) 09/29/2021    BUNCREA 20.8 (H) 08/31/2020    CREATSERUM 0.80 09/29/2021    ANIONGAP 5 09/29/2021    GFR 94 09/06/2017    GFRNAA 86 09/29/2021    GFRAA 99 09/29/2021    CA 9.4 09/29/2 (BMI) of 37.0 to 37.9 in adult, unspecified obesity type ([facility] [address]    Prediabetes    Mixed hyperlipidemia    Anxiety and depression    Stress        PLAN  itial Weight: 220lbs  Initial Weight Date: 10/27/21  Today's Weight: 219lbs  5% Goal: 11lbs  10% Goal: 22

## 2021-12-13 ENCOUNTER — OFFICE VISIT (OUTPATIENT)
Dept: INTERNAL MEDICINE CLINIC | Facility: CLINIC | Age: 51
End: 2021-12-13
Payer: COMMERCIAL

## 2021-12-13 VITALS — WEIGHT: 219 LBS | HEIGHT: 64 IN | BODY MASS INDEX: 37.39 KG/M2

## 2021-12-13 DIAGNOSIS — E66.09 CLASS 2 OBESITY DUE TO EXCESS CALORIES WITHOUT SERIOUS COMORBIDITY WITH BODY MASS INDEX (BMI) OF 37.0 TO 37.9 IN ADULT: Primary | ICD-10-CM

## 2021-12-13 PROCEDURE — 3008F BODY MASS INDEX DOCD: CPT | Performed by: DIETITIAN, REGISTERED

## 2021-12-13 PROCEDURE — 97802 MEDICAL NUTRITION INDIV IN: CPT | Performed by: DIETITIAN, REGISTERED

## 2021-12-13 NOTE — PROGRESS NOTES
INITIAL OUTPATIENT NUTRITION CONSULTATION    Nutrition Assessment    Medical Diagnosis: Obesity, pre-DM    Physical Findings: none reported    Client Age and Gender: 46year old female    Marital Status and Occupation: , employed through [employer] c (95.3 kg)  08/12/20 : 209 lb 6 oz (95 kg)      BMI Readings from Last 1 Encounters:  12/13/21 : 37.59 kg/m²      Weight change: No change since LOV    Diet/Weight History: Used to be an athlete. Started working for [employer], working long hours, sedentary.  Stop Is struggling to keep food records d/t busy work/home schedule. Recommend pt try pre-logging foods for the next day in the evenings. Reviewed the importance of consistency w/ small changes to see results. Patient agreed to goals below.     Goals:   • Try ra

## 2021-12-13 NOTE — PATIENT INSTRUCTIONS
Goals:  • Try raw veggies, herbal tea, or Gavi along with 4 pm snack  • Track in MyFitnessPal, 1400 kcals per day

## 2021-12-13 NOTE — PROGRESS NOTES
Vit D is low, begin 50,000IU weekly x 12 weeks, and then switch to OTC 2,000IU daily, take with food  B12 is within good range

## 2022-09-14 ENCOUNTER — TELEPHONE (OUTPATIENT)
Dept: FAMILY MEDICINE CLINIC | Facility: CLINIC | Age: 52
End: 2022-09-14

## 2022-09-14 DIAGNOSIS — E55.9 VITAMIN D DEFICIENCY: ICD-10-CM

## 2022-09-14 DIAGNOSIS — R73.03 PREDIABETES: ICD-10-CM

## 2022-09-14 DIAGNOSIS — Z00.00 LABORATORY EXAMINATION ORDERED AS PART OF A ROUTINE GENERAL MEDICAL EXAMINATION: Primary | ICD-10-CM

## 2022-09-28 ENCOUNTER — OFFICE VISIT (OUTPATIENT)
Dept: FAMILY MEDICINE CLINIC | Facility: CLINIC | Age: 52
End: 2022-09-28

## 2022-09-28 VITALS
TEMPERATURE: 98 F | SYSTOLIC BLOOD PRESSURE: 128 MMHG | BODY MASS INDEX: 38.07 KG/M2 | RESPIRATION RATE: 14 BRPM | OXYGEN SATURATION: 98 % | WEIGHT: 223 LBS | HEIGHT: 64 IN | DIASTOLIC BLOOD PRESSURE: 88 MMHG | HEART RATE: 84 BPM

## 2022-09-28 DIAGNOSIS — M25.551 RIGHT HIP PAIN: ICD-10-CM

## 2022-09-28 DIAGNOSIS — G43.709 CHRONIC MIGRAINE WITHOUT AURA WITHOUT STATUS MIGRAINOSUS, NOT INTRACTABLE: ICD-10-CM

## 2022-09-28 DIAGNOSIS — Z00.00 ANNUAL PHYSICAL EXAM: Primary | ICD-10-CM

## 2022-09-28 DIAGNOSIS — Z12.31 SCREENING MAMMOGRAM FOR BREAST CANCER: ICD-10-CM

## 2022-09-28 DIAGNOSIS — E66.09 CLASS 2 OBESITY DUE TO EXCESS CALORIES WITHOUT SERIOUS COMORBIDITY WITH BODY MASS INDEX (BMI) OF 38.0 TO 38.9 IN ADULT: ICD-10-CM

## 2022-09-28 DIAGNOSIS — Z13.31 NEGATIVE DEPRESSION SCREENING: ICD-10-CM

## 2022-09-28 PROCEDURE — 99213 OFFICE O/P EST LOW 20 MIN: CPT | Performed by: FAMILY MEDICINE

## 2022-09-28 PROCEDURE — 3074F SYST BP LT 130 MM HG: CPT | Performed by: FAMILY MEDICINE

## 2022-09-28 PROCEDURE — 99396 PREV VISIT EST AGE 40-64: CPT | Performed by: FAMILY MEDICINE

## 2022-09-28 PROCEDURE — 3079F DIAST BP 80-89 MM HG: CPT | Performed by: FAMILY MEDICINE

## 2022-09-28 PROCEDURE — 3008F BODY MASS INDEX DOCD: CPT | Performed by: FAMILY MEDICINE

## 2022-09-28 RX ORDER — ELETRIPTAN HYDROBROMIDE 20 MG/1
20 TABLET, FILM COATED ORAL AS NEEDED
Qty: 9 TABLET | Refills: 0 | Status: SHIPPED | OUTPATIENT
Start: 2022-09-28 | End: 2022-09-28

## 2022-09-28 RX ORDER — ELETRIPTAN HYDROBROMIDE 20 MG/1
20 TABLET, FILM COATED ORAL AS NEEDED
Qty: 9 TABLET | Refills: 5 | Status: SHIPPED | OUTPATIENT
Start: 2022-09-28

## 2022-09-28 RX ORDER — SEMAGLUTIDE 1.34 MG/ML
0.25 INJECTION, SOLUTION SUBCUTANEOUS WEEKLY
Qty: 1 EACH | Refills: 0 | Status: SHIPPED | OUTPATIENT
Start: 2022-09-28

## 2022-09-29 ENCOUNTER — TELEPHONE (OUTPATIENT)
Dept: FAMILY MEDICINE CLINIC | Facility: CLINIC | Age: 52
End: 2022-09-29

## 2022-09-29 NOTE — TELEPHONE ENCOUNTER
Received fax from pharmacy on Eletriptan 20mg, requesting frequency of use, max # of tabs/day and/or tabs/week    Please advise

## 2022-10-12 ENCOUNTER — LABORATORY ENCOUNTER (OUTPATIENT)
Dept: LAB | Age: 52
End: 2022-10-12
Attending: FAMILY MEDICINE
Payer: COMMERCIAL

## 2022-10-12 DIAGNOSIS — E55.9 VITAMIN D DEFICIENCY: ICD-10-CM

## 2022-10-12 DIAGNOSIS — R73.03 PREDIABETES: ICD-10-CM

## 2022-10-12 DIAGNOSIS — Z00.00 LABORATORY EXAMINATION ORDERED AS PART OF A ROUTINE GENERAL MEDICAL EXAMINATION: ICD-10-CM

## 2022-10-12 LAB
ALBUMIN SERPL-MCNC: 3.5 G/DL (ref 3.4–5)
ALBUMIN/GLOB SERPL: 1 {RATIO} (ref 1–2)
ALP LIVER SERPL-CCNC: 64 U/L
ALT SERPL-CCNC: 24 U/L
ANION GAP SERPL CALC-SCNC: 5 MMOL/L (ref 0–18)
AST SERPL-CCNC: 14 U/L (ref 15–37)
BASOPHILS # BLD AUTO: 0.02 X10(3) UL (ref 0–0.2)
BASOPHILS NFR BLD AUTO: 0.3 %
BILIRUB SERPL-MCNC: 0.4 MG/DL (ref 0.1–2)
BUN BLD-MCNC: 20 MG/DL (ref 7–18)
CALCIUM BLD-MCNC: 8.8 MG/DL (ref 8.5–10.1)
CHLORIDE SERPL-SCNC: 109 MMOL/L (ref 98–112)
CHOLEST SERPL-MCNC: 158 MG/DL (ref ?–200)
CO2 SERPL-SCNC: 25 MMOL/L (ref 21–32)
CREAT BLD-MCNC: 0.7 MG/DL
EOSINOPHIL # BLD AUTO: 0.15 X10(3) UL (ref 0–0.7)
EOSINOPHIL NFR BLD AUTO: 2.3 %
ERYTHROCYTE [DISTWIDTH] IN BLOOD BY AUTOMATED COUNT: 14.4 %
EST. AVERAGE GLUCOSE BLD GHB EST-MCNC: 126 MG/DL (ref 68–126)
FASTING PATIENT LIPID ANSWER: YES
FASTING STATUS PATIENT QL REPORTED: YES
GFR SERPLBLD BASED ON 1.73 SQ M-ARVRAT: 104 ML/MIN/1.73M2 (ref 60–?)
GLOBULIN PLAS-MCNC: 3.5 G/DL (ref 2.8–4.4)
GLUCOSE BLD-MCNC: 89 MG/DL (ref 70–99)
HBA1C MFR BLD: 6 % (ref ?–5.7)
HCT VFR BLD AUTO: 41.9 %
HDLC SERPL-MCNC: 74 MG/DL (ref 40–59)
HGB BLD-MCNC: 13.5 G/DL
IMM GRANULOCYTES # BLD AUTO: 0.02 X10(3) UL (ref 0–1)
IMM GRANULOCYTES NFR BLD: 0.3 %
LDLC SERPL CALC-MCNC: 71 MG/DL (ref ?–100)
LYMPHOCYTES # BLD AUTO: 2.74 X10(3) UL (ref 1–4)
LYMPHOCYTES NFR BLD AUTO: 41.2 %
MCH RBC QN AUTO: 30.2 PG (ref 26–34)
MCHC RBC AUTO-ENTMCNC: 32.2 G/DL (ref 31–37)
MCV RBC AUTO: 93.7 FL
MONOCYTES # BLD AUTO: 0.47 X10(3) UL (ref 0.1–1)
MONOCYTES NFR BLD AUTO: 7.1 %
NEUTROPHILS # BLD AUTO: 3.25 X10 (3) UL (ref 1.5–7.7)
NEUTROPHILS # BLD AUTO: 3.25 X10(3) UL (ref 1.5–7.7)
NEUTROPHILS NFR BLD AUTO: 48.8 %
NONHDLC SERPL-MCNC: 84 MG/DL (ref ?–130)
OSMOLALITY SERPL CALC.SUM OF ELEC: 290 MOSM/KG (ref 275–295)
PLATELET # BLD AUTO: 290 10(3)UL (ref 150–450)
POTASSIUM SERPL-SCNC: 3.9 MMOL/L (ref 3.5–5.1)
PROT SERPL-MCNC: 7 G/DL (ref 6.4–8.2)
RBC # BLD AUTO: 4.47 X10(6)UL
SODIUM SERPL-SCNC: 139 MMOL/L (ref 136–145)
TRIGL SERPL-MCNC: 69 MG/DL (ref 30–149)
TSI SER-ACNC: 3.38 MIU/ML (ref 0.36–3.74)
VIT D+METAB SERPL-MCNC: 25.7 NG/ML (ref 30–100)
VLDLC SERPL CALC-MCNC: 10 MG/DL (ref 0–30)
WBC # BLD AUTO: 6.7 X10(3) UL (ref 4–11)

## 2022-10-12 PROCEDURE — 82306 VITAMIN D 25 HYDROXY: CPT | Performed by: FAMILY MEDICINE

## 2022-10-12 PROCEDURE — 80050 GENERAL HEALTH PANEL: CPT | Performed by: FAMILY MEDICINE

## 2022-10-12 PROCEDURE — 80061 LIPID PANEL: CPT | Performed by: FAMILY MEDICINE

## 2022-10-12 PROCEDURE — 83036 HEMOGLOBIN GLYCOSYLATED A1C: CPT | Performed by: FAMILY MEDICINE

## 2022-10-20 ENCOUNTER — HOSPITAL ENCOUNTER (OUTPATIENT)
Dept: MAMMOGRAPHY | Age: 52
Discharge: HOME | End: 2022-10-20
Attending: FAMILY MEDICINE
Payer: COMMERCIAL

## 2022-10-20 ENCOUNTER — HOSPITAL ENCOUNTER (OUTPATIENT)
Dept: GENERAL RADIOLOGY | Age: 52
Discharge: HOME | End: 2022-10-20
Attending: FAMILY MEDICINE
Payer: COMMERCIAL

## 2022-10-20 DIAGNOSIS — M25.551 RIGHT HIP PAIN: ICD-10-CM

## 2022-10-20 DIAGNOSIS — Z12.31 SCREENING MAMMOGRAM FOR BREAST CANCER: ICD-10-CM

## 2022-10-20 PROCEDURE — 77067 SCR MAMMO BI INCL CAD: CPT | Performed by: FAMILY MEDICINE

## 2022-10-20 PROCEDURE — 77063 BREAST TOMOSYNTHESIS BI: CPT | Performed by: FAMILY MEDICINE

## 2022-10-20 PROCEDURE — 73502 X-RAY EXAM HIP UNI 2-3 VIEWS: CPT | Performed by: FAMILY MEDICINE

## 2022-11-02 DIAGNOSIS — E66.09 CLASS 2 OBESITY DUE TO EXCESS CALORIES WITHOUT SERIOUS COMORBIDITY WITH BODY MASS INDEX (BMI) OF 38.0 TO 38.9 IN ADULT: ICD-10-CM

## 2022-11-02 RX ORDER — PEN NEEDLE, DIABETIC 30 GX3/16"
1 NEEDLE, DISPOSABLE MISCELLANEOUS
Qty: 30 EACH | Refills: 0 | Status: SHIPPED | OUTPATIENT
Start: 2022-11-02

## 2022-11-02 RX ORDER — SEMAGLUTIDE 1.34 MG/ML
0.25 INJECTION, SOLUTION SUBCUTANEOUS WEEKLY
Qty: 1 EACH | Refills: 0 | Status: SHIPPED | OUTPATIENT
Start: 2022-11-02

## 2022-11-02 NOTE — TELEPHONE ENCOUNTER
Pt states she has 3 wks left of this med. semaglutide (OZEMPIC, 0.25 OR 0.5 MG/DOSE,) 2 MG/1.5ML Subcutaneous Solution Pen-injector  Pt is requesting a refill along with the needles.   Future Appointments   Date Time Provider Michelle Carroll   1/18/2023  1:20 PM Luis Dsouza PA-C [address] [facility] [address]

## 2022-11-07 NOTE — TELEPHONE ENCOUNTER
Received denial letter. States denied because pt can use alternatives listed on letter. Letter placed in Dr. Sundar menard for review.

## 2022-12-02 DIAGNOSIS — R92.2 DENSE BREAST TISSUE ON MAMMOGRAM: Primary | ICD-10-CM

## 2023-01-18 ENCOUNTER — OFFICE VISIT (OUTPATIENT)
Dept: INTERNAL MEDICINE CLINIC | Facility: CLINIC | Age: 53
End: 2023-01-18
Payer: COMMERCIAL

## 2023-01-18 VITALS
RESPIRATION RATE: 16 BRPM | BODY MASS INDEX: 37.56 KG/M2 | WEIGHT: 220 LBS | DIASTOLIC BLOOD PRESSURE: 80 MMHG | HEIGHT: 64 IN | HEART RATE: 86 BPM | SYSTOLIC BLOOD PRESSURE: 124 MMHG

## 2023-01-18 DIAGNOSIS — Z51.81 ENCOUNTER FOR THERAPEUTIC DRUG LEVEL MONITORING: Primary | ICD-10-CM

## 2023-01-18 DIAGNOSIS — R73.03 PREDIABETES: ICD-10-CM

## 2023-01-18 DIAGNOSIS — F32.A ANXIETY AND DEPRESSION: ICD-10-CM

## 2023-01-18 DIAGNOSIS — E55.9 VITAMIN D DEFICIENCY: ICD-10-CM

## 2023-01-18 DIAGNOSIS — E66.01 CLASS 2 SEVERE OBESITY WITH SERIOUS COMORBIDITY AND BODY MASS INDEX (BMI) OF 37.0 TO 37.9 IN ADULT, UNSPECIFIED OBESITY TYPE (HCC): ICD-10-CM

## 2023-01-18 DIAGNOSIS — E78.2 MIXED HYPERLIPIDEMIA: ICD-10-CM

## 2023-01-18 DIAGNOSIS — F41.9 ANXIETY AND DEPRESSION: ICD-10-CM

## 2023-01-18 PROCEDURE — 99214 OFFICE O/P EST MOD 30 MIN: CPT | Performed by: PHYSICIAN ASSISTANT

## 2023-01-18 PROCEDURE — 3079F DIAST BP 80-89 MM HG: CPT | Performed by: PHYSICIAN ASSISTANT

## 2023-01-18 PROCEDURE — 3008F BODY MASS INDEX DOCD: CPT | Performed by: PHYSICIAN ASSISTANT

## 2023-01-18 PROCEDURE — 3074F SYST BP LT 130 MM HG: CPT | Performed by: PHYSICIAN ASSISTANT

## 2023-01-18 NOTE — PATIENT INSTRUCTIONS
1400 calories    Moderate Carb (30/35/35)   106g   protein      55g   fats      124g   carbs     Lower Carb (40/40/20)   142g   protein      63g   fats      71g   carbs

## 2023-04-19 ENCOUNTER — OFFICE VISIT (OUTPATIENT)
Dept: INTERNAL MEDICINE CLINIC | Facility: CLINIC | Age: 53
End: 2023-04-19
Payer: COMMERCIAL

## 2023-04-19 VITALS
HEART RATE: 68 BPM | BODY MASS INDEX: 36.37 KG/M2 | SYSTOLIC BLOOD PRESSURE: 110 MMHG | DIASTOLIC BLOOD PRESSURE: 62 MMHG | RESPIRATION RATE: 16 BRPM | HEIGHT: 64 IN | WEIGHT: 213 LBS

## 2023-04-19 DIAGNOSIS — Z51.81 ENCOUNTER FOR THERAPEUTIC DRUG LEVEL MONITORING: Primary | ICD-10-CM

## 2023-04-19 DIAGNOSIS — E78.2 MIXED HYPERLIPIDEMIA: ICD-10-CM

## 2023-04-19 DIAGNOSIS — E66.01 CLASS 2 SEVERE OBESITY WITH SERIOUS COMORBIDITY AND BODY MASS INDEX (BMI) OF 36.0 TO 36.9 IN ADULT, UNSPECIFIED OBESITY TYPE (HCC): ICD-10-CM

## 2023-04-19 DIAGNOSIS — R73.03 PREDIABETES: ICD-10-CM

## 2023-04-19 DIAGNOSIS — E55.9 VITAMIN D DEFICIENCY: ICD-10-CM

## 2023-04-19 PROCEDURE — 3078F DIAST BP <80 MM HG: CPT | Performed by: PHYSICIAN ASSISTANT

## 2023-04-19 PROCEDURE — 99214 OFFICE O/P EST MOD 30 MIN: CPT | Performed by: PHYSICIAN ASSISTANT

## 2023-04-19 PROCEDURE — 3074F SYST BP LT 130 MM HG: CPT | Performed by: PHYSICIAN ASSISTANT

## 2023-04-19 PROCEDURE — 3008F BODY MASS INDEX DOCD: CPT | Performed by: PHYSICIAN ASSISTANT

## 2023-04-19 RX ORDER — SEMAGLUTIDE 2.68 MG/ML
2 INJECTION, SOLUTION SUBCUTANEOUS WEEKLY
Qty: 9 ML | Refills: 1 | Status: SHIPPED | OUTPATIENT
Start: 2023-04-19

## 2023-10-10 ENCOUNTER — OFFICE VISIT (OUTPATIENT)
Dept: FAMILY MEDICINE CLINIC | Facility: CLINIC | Age: 53
End: 2023-10-10
Payer: COMMERCIAL

## 2023-10-10 VITALS
WEIGHT: 218 LBS | HEIGHT: 64 IN | SYSTOLIC BLOOD PRESSURE: 110 MMHG | RESPIRATION RATE: 16 BRPM | DIASTOLIC BLOOD PRESSURE: 74 MMHG | OXYGEN SATURATION: 98 % | BODY MASS INDEX: 37.22 KG/M2 | TEMPERATURE: 98 F | HEART RATE: 78 BPM

## 2023-10-10 DIAGNOSIS — Z12.31 BREAST CANCER SCREENING BY MAMMOGRAM: ICD-10-CM

## 2023-10-10 DIAGNOSIS — Z00.00 WELL ADULT EXAM: Primary | ICD-10-CM

## 2023-10-10 DIAGNOSIS — R73.03 PREDIABETES: ICD-10-CM

## 2023-10-10 DIAGNOSIS — L98.9 SKIN LESION: ICD-10-CM

## 2023-10-10 PROCEDURE — 3078F DIAST BP <80 MM HG: CPT | Performed by: FAMILY MEDICINE

## 2023-10-10 PROCEDURE — 99396 PREV VISIT EST AGE 40-64: CPT | Performed by: FAMILY MEDICINE

## 2023-10-10 PROCEDURE — 3074F SYST BP LT 130 MM HG: CPT | Performed by: FAMILY MEDICINE

## 2023-10-10 PROCEDURE — 3008F BODY MASS INDEX DOCD: CPT | Performed by: FAMILY MEDICINE

## 2023-12-18 ENCOUNTER — HOSPITAL ENCOUNTER (EMERGENCY)
Facility: HOSPITAL | Age: 53
Discharge: HOME | End: 2023-12-18
Attending: STUDENT IN AN ORGANIZED HEALTH CARE EDUCATION/TRAINING PROGRAM
Payer: COMMERCIAL

## 2023-12-18 ENCOUNTER — APPOINTMENT (OUTPATIENT)
Dept: CT IMAGING | Facility: HOSPITAL | Age: 53
End: 2023-12-18
Attending: STUDENT IN AN ORGANIZED HEALTH CARE EDUCATION/TRAINING PROGRAM
Payer: COMMERCIAL

## 2023-12-18 VITALS
HEART RATE: 69 BPM | OXYGEN SATURATION: 95 % | HEIGHT: 64 IN | BODY MASS INDEX: 36.7 KG/M2 | WEIGHT: 215 LBS | TEMPERATURE: 99 F | DIASTOLIC BLOOD PRESSURE: 76 MMHG | RESPIRATION RATE: 16 BRPM | SYSTOLIC BLOOD PRESSURE: 129 MMHG

## 2023-12-18 DIAGNOSIS — N23 URETERAL COLIC: Primary | ICD-10-CM

## 2023-12-18 LAB
ALBUMIN SERPL-MCNC: 3.5 G/DL (ref 3.4–5)
ALBUMIN/GLOB SERPL: 0.9 {RATIO} (ref 1–2)
ALP LIVER SERPL-CCNC: 84 U/L
ALT SERPL-CCNC: 29 U/L
ANION GAP SERPL CALC-SCNC: 3 MMOL/L (ref 0–18)
AST SERPL-CCNC: 20 U/L (ref 15–37)
BASOPHILS # BLD AUTO: 0.03 X10(3) UL (ref 0–0.2)
BASOPHILS NFR BLD AUTO: 0.5 %
BILIRUB SERPL-MCNC: 0.5 MG/DL (ref 0.1–2)
BILIRUB UR QL STRIP.AUTO: NEGATIVE
BUN BLD-MCNC: 17 MG/DL (ref 9–23)
CALCIUM BLD-MCNC: 8.8 MG/DL (ref 8.5–10.1)
CHLORIDE SERPL-SCNC: 110 MMOL/L (ref 98–112)
CO2 SERPL-SCNC: 27 MMOL/L (ref 21–32)
CREAT BLD-MCNC: 0.96 MG/DL
EGFRCR SERPLBLD CKD-EPI 2021: 71 ML/MIN/1.73M2 (ref 60–?)
EOSINOPHIL # BLD AUTO: 0.1 X10(3) UL (ref 0–0.7)
EOSINOPHIL NFR BLD AUTO: 1.5 %
ERYTHROCYTE [DISTWIDTH] IN BLOOD BY AUTOMATED COUNT: 13.5 %
GLOBULIN PLAS-MCNC: 3.7 G/DL (ref 2.8–4.4)
GLUCOSE BLD-MCNC: 95 MG/DL (ref 70–99)
GLUCOSE UR STRIP.AUTO-MCNC: NORMAL MG/DL
HCT VFR BLD AUTO: 42.2 %
HGB BLD-MCNC: 13.6 G/DL
IMM GRANULOCYTES # BLD AUTO: 0.01 X10(3) UL (ref 0–1)
IMM GRANULOCYTES NFR BLD: 0.2 %
KETONES UR STRIP.AUTO-MCNC: NEGATIVE MG/DL
LEUKOCYTE ESTERASE UR QL STRIP.AUTO: NEGATIVE
LIPASE SERPL-CCNC: 38 U/L (ref 13–75)
LYMPHOCYTES # BLD AUTO: 1.69 X10(3) UL (ref 1–4)
LYMPHOCYTES NFR BLD AUTO: 25.8 %
MCH RBC QN AUTO: 29.3 PG (ref 26–34)
MCHC RBC AUTO-ENTMCNC: 32.2 G/DL (ref 31–37)
MCV RBC AUTO: 90.9 FL
MONOCYTES # BLD AUTO: 0.43 X10(3) UL (ref 0.1–1)
MONOCYTES NFR BLD AUTO: 6.6 %
NEUTROPHILS # BLD AUTO: 4.28 X10 (3) UL (ref 1.5–7.7)
NEUTROPHILS # BLD AUTO: 4.28 X10(3) UL (ref 1.5–7.7)
NEUTROPHILS NFR BLD AUTO: 65.4 %
NITRITE UR QL STRIP.AUTO: NEGATIVE
OSMOLALITY SERPL CALC.SUM OF ELEC: 291 MOSM/KG (ref 275–295)
PH UR STRIP.AUTO: 7.5 [PH] (ref 5–8)
PLATELET # BLD AUTO: 287 10(3)UL (ref 150–450)
POTASSIUM SERPL-SCNC: 4.5 MMOL/L (ref 3.5–5.1)
PROT SERPL-MCNC: 7.2 G/DL (ref 6.4–8.2)
PROT UR STRIP.AUTO-MCNC: NEGATIVE MG/DL
RBC # BLD AUTO: 4.64 X10(6)UL
RBC #/AREA URNS AUTO: >10 /HPF
SODIUM SERPL-SCNC: 140 MMOL/L (ref 136–145)
SP GR UR STRIP.AUTO: 1.01 (ref 1–1.03)
UROBILINOGEN UR STRIP.AUTO-MCNC: NORMAL MG/DL
WBC # BLD AUTO: 6.5 X10(3) UL (ref 4–11)

## 2023-12-18 PROCEDURE — 74177 CT ABD & PELVIS W/CONTRAST: CPT | Performed by: STUDENT IN AN ORGANIZED HEALTH CARE EDUCATION/TRAINING PROGRAM

## 2023-12-18 PROCEDURE — 80053 COMPREHEN METABOLIC PANEL: CPT | Performed by: STUDENT IN AN ORGANIZED HEALTH CARE EDUCATION/TRAINING PROGRAM

## 2023-12-18 PROCEDURE — 99285 EMERGENCY DEPT VISIT HI MDM: CPT

## 2023-12-18 PROCEDURE — 83690 ASSAY OF LIPASE: CPT | Performed by: STUDENT IN AN ORGANIZED HEALTH CARE EDUCATION/TRAINING PROGRAM

## 2023-12-18 PROCEDURE — 99284 EMERGENCY DEPT VISIT MOD MDM: CPT

## 2023-12-18 PROCEDURE — 85025 COMPLETE CBC W/AUTO DIFF WBC: CPT | Performed by: STUDENT IN AN ORGANIZED HEALTH CARE EDUCATION/TRAINING PROGRAM

## 2023-12-18 PROCEDURE — 81001 URINALYSIS AUTO W/SCOPE: CPT | Performed by: STUDENT IN AN ORGANIZED HEALTH CARE EDUCATION/TRAINING PROGRAM

## 2023-12-18 PROCEDURE — 36415 COLL VENOUS BLD VENIPUNCTURE: CPT

## 2023-12-18 RX ORDER — OXYCODONE HYDROCHLORIDE AND ACETAMINOPHEN 5; 325 MG/1; MG/1
1 TABLET ORAL EVERY 8 HOURS PRN
Qty: 10 TABLET | Refills: 0 | Status: SHIPPED | OUTPATIENT
Start: 2023-12-18 | End: 2023-12-23

## 2023-12-18 RX ORDER — IBUPROFEN 600 MG/1
600 TABLET ORAL EVERY 8 HOURS PRN
Qty: 30 TABLET | Refills: 0 | Status: SHIPPED | OUTPATIENT
Start: 2023-12-18 | End: 2023-12-25

## 2023-12-18 NOTE — ED INITIAL ASSESSMENT (HPI)
Patient reports LLQ pain since last night, this morning now in left flank. + nausea, denies diarrhea, denies fever, denies dysuria

## 2023-12-23 NOTE — PROGRESS NOTES
HPI:   Jeramie Lugo is a 48year old female that presents for dysuria. Patient presents with:  Painful Urination    Dysuria for 2 days. Some mild external labial irritation. No urgency, frequency, hematuria.   Denies fever, chills, nausea, vomiting, Please notify Alec that his H pylori breath test is negative (normal). 97%   BMI 36.05 kg/m²  Estimated body mass index is 36.05 kg/m² as calculated from the following:    Height as of this encounter: 64\". Weight as of this encounter: 210 lb (95.3 kg). Vital signs reviewed. Appears stated age, well groomed.   Physical Exa

## 2024-01-02 ENCOUNTER — TELEPHONE (OUTPATIENT)
Dept: SURGERY | Facility: CLINIC | Age: 54
End: 2024-01-02

## 2024-01-02 ENCOUNTER — OFFICE VISIT (OUTPATIENT)
Dept: SURGERY | Facility: CLINIC | Age: 54
End: 2024-01-02
Payer: COMMERCIAL

## 2024-01-02 DIAGNOSIS — N20.0 KIDNEY STONE: ICD-10-CM

## 2024-01-02 DIAGNOSIS — N20.0 KIDNEY STONE: Primary | ICD-10-CM

## 2024-01-02 DIAGNOSIS — R82.90 URINE FINDING: ICD-10-CM

## 2024-01-02 DIAGNOSIS — Z01.818 PRE-OP TESTING: Primary | ICD-10-CM

## 2024-01-02 LAB
APPEARANCE: CLEAR
BILIRUBIN: NEGATIVE
GLUCOSE (URINE DIPSTICK): NEGATIVE MG/DL
KETONES (URINE DIPSTICK): NEGATIVE MG/DL
LEUKOCYTES: NEGATIVE
MULTISTIX LOT#: ABNORMAL NUMERIC
NITRITE, URINE: NEGATIVE
PH, URINE: 6 (ref 4.5–8)
PROTEIN (URINE DIPSTICK): NEGATIVE MG/DL
SPECIFIC GRAVITY: 1.01 (ref 1–1.03)
URINE-COLOR: YELLOW
UROBILINOGEN,SEMI-QN: 0.2 MG/DL (ref 0–1.9)

## 2024-01-02 PROCEDURE — 81003 URINALYSIS AUTO W/O SCOPE: CPT

## 2024-01-02 PROCEDURE — 99204 OFFICE O/P NEW MOD 45 MIN: CPT

## 2024-01-02 RX ORDER — TAMSULOSIN HYDROCHLORIDE 0.4 MG/1
0.4 CAPSULE ORAL EVERY EVENING
Qty: 90 CAPSULE | Refills: 0 | Status: SHIPPED | OUTPATIENT
Start: 2024-01-02

## 2024-01-02 NOTE — PROGRESS NOTES
[facility], [facility]    Urology Consult Note    History of Present Illness:   Patient is a(n) 53 year old female with hx of anxiety, depression, obesity, migraines, GERD, and preDM who presents for kidney stone consult.    Pt went to ED on 12/18/23 for LLQ and left flank pain. CT A/P showed mildly obstructing 6mm left UPJ stone. There are also multiple nonobstructive kidney stones with largest in left kidney at 3mm and largest in right kidney at 3mm. Pt was discharged on norco.     Currently she feels generally well. Has some constant left flank pain and some urgency and frequency with small voids. Pain is tolerable and only had a couple instances in the past 2 wks where it was more severe, manageable with ibuprofen.     Drinks 64oz+ water a day, occasional cup of coffee. BM regular. Strong fhx of kidney stones but no personal hx of stones until now.     HISTORY:  Past Medical History:   Diagnosis Date    Abdominal wall lump 09/03/2013    right upper quadrant under right ribs at mid clavicular line.     Abnormal mammogram     Anxiety and depression 09/03/2013    Atopic dermatitis 12/19/2012    Breast density 04/19/2011    asymmetric nodular density in the R breast    Chronic knee pain 12/19/2012    Class 2 obesity due to excess calories without serious comorbidity in adult 08/12/2020    Decorative tattoo 1993    Right leg above ankle    Fatigue     Due to lack of sleep    Flatulence/gas pain/belching     More as I get older    Headache disorder     Migraines. Have not had in a while    Heartburn     Occasional    Hemorrhoids 09/2017    Doctor advised present    History of cardiac murmur     According to my mother i was born with one.    Lumbago 12/19/2012    chronic    Migraines 09/03/2013    Obesity     Prediabetes 09/04/2020    Sleep disturbance 07/2020    Insomnia related to pre-menopause    Stress     Stress reaction 12/19/2012    Vitamin D deficiency 09/03/2013      Past  Surgical History:   Procedure Laterality Date    ANESTHESIA FOR;  PROCEDURES      BREAST SURGERY PROCEDURE UNLISTED      breast lump removed, benign finding    COLONOSCOPY  2020    1 polyp removed    AUNG LOCALIZATION WIRE 1 SITE RIGHT (CPT=19281)            TUBAL LIGATION        Family History   Problem Relation Age of Onset    Other (Kidney stones) Father     Colon Polyps Father     Cancer Father         Mycosis fungoides    Allergies Mother         hayfever    Heart Attack Maternal Grandmother     Lung Disorder Maternal Grandfather     Stroke Paternal Grandmother     Psychiatric Paternal Grandfather     Other (Kidney stones) Brother     Other (Kidney stones) Other         uncles    Allergies Sister         hayfever    Ovarian Cancer Maternal Aunt     Breast Cancer Neg     Colon Cancer Neg     Ovarian Cancer Neg       Social History:   Social History     Socioeconomic History    Marital status:    Tobacco Use    Smoking status: Former     Packs/day: 0.00     Years: 0.00     Additional pack years: 0.00     Total pack years: 0.00     Types: Cigarettes     Quit date: 2010     Years since quittin.8    Smokeless tobacco: Never   Vaping Use    Vaping Use: Never used   Substance and Sexual Activity    Alcohol use: Yes     Comment: 1-2 per month    Drug use: No    Sexual activity: Yes     Partners: Male   Other Topics Concern    Caffeine Concern No    Sleep Concern No    Stress Concern No    Weight Concern No    Special Diet No    Exercise No    Seat Belt No    Self-Exams No        Allergies  Allergies   Allergen Reactions    Neomycin SWELLING     Ear swollen, severe rash       Review of Systems:   A 10-point review of systems was completed and is negative other than as noted above.    Physical Exam:   [facility] 2022     GENERAL APPEARANCE: no acute distress  NEUROLOGIC: converses appropriately  HEAD: atraumatic, normocephalic  LUNGS: non-labored breathing  ABDOMEN: soft,  nontender, non-distended  BACK: no CVA tenderness  PSYCH: appropriate affect and mood    Results:     Laboratory Data:  Lab Results   Component Value Date    WBC 6.5 12/18/2023    HGB 13.6 12/18/2023    .0 12/18/2023     Lab Results   Component Value Date     12/18/2023    K 4.5 12/18/2023     12/18/2023    CO2 27.0 12/18/2023    BUN 17 12/18/2023    GLU 95 12/18/2023    GFRAA 99 09/29/2021    AST 20 12/18/2023    ALT 29 12/18/2023    TP 7.2 12/18/2023    ALB 3.5 12/18/2023    CA 8.8 12/18/2023    MG 1.9 09/19/2013       Urinalysis Results (last 3 years):  Recent Labs     12/18/23  1020   COLORUR Light-Yellow   CLARITY Turbid*   SPECGRAVITY 1.012   PHURINE 7.5   PROUR Negative   GLUUR Normal   KETUR Negative   BILUR Negative   BLOODURINE 1+*   NITRITE Negative   UROBILINOGEN Normal   LEUUR Negative   WBCUR 1-5   RBCUR >10*   BACUR Rare*       Urine Culture Results (last 3 years):  Lab Results   Component Value Date    URINECUL No Growth at 18-24 hrs. 11/05/2020       Imaging  CT ABDOMEN+PELVIS(CONTRAST ONLY)(CPT=74177)    Result Date: 12/18/2023  PROCEDURE:  CT ABDOMEN+PELVIS (CONTRAST ONLY) (CPT=74177)  COMPARISON:  None.  INDICATIONS:  LLQ pain  TECHNIQUE:  CT scanning was performed from the dome of the diaphragm to the pubic symphysis with non-ionic intravenous contrast material. Post contrast coronal MPR imaging was performed.  Dose reduction techniques were used. Dose information is transmitted to the ACR (American College of Radiology) NRDR (National Radiology Data Registry) which includes the Dose Index Registry.  PATIENT STATED HISTORY:(As transcribed by Technologist)  Patient reports LLQ pain since last night, this morning now in left flank. + nausea, denies diarrhea, denies fever, denies dysuria    CONTRAST USED:  100cc of Isovue 370  FINDINGS:  LIVER:  No enlargement, atrophy, abnormal density, or significant focal lesion.  BILIARY:  No visible dilatation or calcification.  PANCREAS:   No lesion, fluid collection, ductal dilatation, or atrophy.  SPLEEN:  No enlargement or focal lesion.  KIDNEYS:  Multiple nonobstructing calculi in both kidneys are noted.  Representative 3 mm calculus in the mid left kidney is noted.  3 mm calculus in lower pole the right kidney is noted.  6 mm calculus in the proximal left ureter is causing mild obstruction. ADRENALS:  No mass or enlargement.  AORTA/VASCULAR:  No aneurysm or dissection.  RETROPERITONEUM:  No mass or adenopathy.  BOWEL/MESENTERY:  No visible mass, obstruction, or bowel wall thickening.  The appendix is normal. ABDOMINAL WALL:  No mass or hernia.  URINARY BLADDER:  No visible focal wall thickening, lesion, or calculus.  PELVIC NODES:  No adenopathy.  PELVIC ORGANS:  No visible mass.  Pelvic organs appropriate for patient age.  BONES:  No bony lesion or fracture.  LUNG BASES:  No visible pulmonary or pleural disease.  OTHER:  Negative.             CONCLUSION:   There is a 6 mm calculus in the proximal left ureter that is causing mild obstruction.  Several nonobstructing small calculi in both kidneys are noted.   LOCATION:  [facility]   Dictated by (CST): Raheem Still MD on 12/18/2023 at 11:41 AM     Finalized by (CST): Raheem Still MD on 12/18/2023 at 11:44 AM           Impression:   Recommendations:  Kidney stone  - discussed MET vs. Surgical management of current stone and benefits and risks of each option were discussed. Given mild degree of discomfort, we agreed to try MET first, repeat CT in 4-6wks and if positive for stone, pt will have surgery then   - declined pain meds and will continue to manage with ibuprofen  - push fluids to 2L a day, avoid constipation  - continue straining urine  - will do 24h urine in near future given fhx of stones and stone burden currently  - she understands to go to ED if develops fever, chills, intractable n/v pain    Thank you very much for this consult. Please call if there are any questions or  concerns.     Jenny Dai PA-C  Urology  [facility]  Phone: [phone]    Date: 1/2/2024  Time: 9:27 AM

## 2024-01-02 NOTE — TELEPHONE ENCOUNTER
Please schedule this patient for surgery.    Thank you!  - urine culture 1wk prior; order placed  - stent removal with me 1wk following procedure; needs to be scheduled    Urology Surgery Request  Surgeon: Micah  Location (if known): [facility]  Procedure: Cystoscopy, LEFT RPG, URS, lithotripsy, stone extraction, stent placement   Anesthesia: General   Time Frame: 4-6 wks  Time required: 60 minutes  Diagnosis: ureteral stone    Antibiotics: per hospital protocol unless checked below   ___ Levaquin 500 mg IV   ___ Gemcitabine 2 g/100 mL NS bladder instillation to be given in OR

## 2024-01-04 ENCOUNTER — PATIENT MESSAGE (OUTPATIENT)
Dept: SURGERY | Facility: CLINIC | Age: 54
End: 2024-01-04

## 2024-01-04 ENCOUNTER — PATIENT MESSAGE (OUTPATIENT)
Dept: FAMILY MEDICINE CLINIC | Facility: CLINIC | Age: 54
End: 2024-01-04

## 2024-01-04 DIAGNOSIS — Z00.00 LABORATORY EXAM ORDERED AS PART OF ROUTINE GENERAL MEDICAL EXAMINATION: Primary | ICD-10-CM

## 2024-01-04 DIAGNOSIS — E66.09 CLASS 2 OBESITY DUE TO EXCESS CALORIES WITHOUT SERIOUS COMORBIDITY WITH BODY MASS INDEX (BMI) OF 38.0 TO 38.9 IN ADULT: ICD-10-CM

## 2024-01-04 NOTE — TELEPHONE ENCOUNTER
Originally to schedule repeat CT and stone removal but pt passed stone today. Instructed to drop stone off for pathology and complete 24h urine.

## 2024-01-05 NOTE — TELEPHONE ENCOUNTER
Last physical 10/10/23  2. Well adult exam  Patient advised to continue to monitor nutrition and get an exercise regimen.   - CBC With Differential With Platelet; Future  - Comp Metabolic Panel (14); Future  - Lipid Panel; Future  - Hemoglobin A1C [E]; Future     Please advise on lab orders.

## 2024-01-05 NOTE — TELEPHONE ENCOUNTER
From: Hamida Atwood  To: Tawnya Rockwell  Sent: 1/4/2024 5:32 PM CST  Subject: Labs blood work    Hi Dr Rockwell,    Is it possible to request some additional blood tests with my routine labs. I am overweight and have been for a while. I have been trying to lose weight and even tried Ozempic for a year with no success. The tests I am looking to add are:  TSH, anti-TPO antibodies, cortisol am, and prolactin.    Please let me know if this is something that can be added to my current order for routine labs. I am getting ready to schedule them for next week.    Thanks

## 2024-01-10 ENCOUNTER — LAB ENCOUNTER (OUTPATIENT)
Dept: LAB | Age: 54
End: 2024-01-10
Payer: COMMERCIAL

## 2024-01-10 DIAGNOSIS — R73.03 PREDIABETES: ICD-10-CM

## 2024-01-10 DIAGNOSIS — Z00.00 WELL ADULT EXAM: ICD-10-CM

## 2024-01-10 DIAGNOSIS — Z00.00 LABORATORY EXAM ORDERED AS PART OF ROUTINE GENERAL MEDICAL EXAMINATION: ICD-10-CM

## 2024-01-10 DIAGNOSIS — E66.09 CLASS 2 OBESITY DUE TO EXCESS CALORIES WITHOUT SERIOUS COMORBIDITY WITH BODY MASS INDEX (BMI) OF 38.0 TO 38.9 IN ADULT: ICD-10-CM

## 2024-01-10 DIAGNOSIS — N20.0 KIDNEY STONE: ICD-10-CM

## 2024-01-10 LAB
ALBUMIN SERPL-MCNC: 3.8 G/DL (ref 3.4–5)
ALBUMIN/GLOB SERPL: 1 {RATIO} (ref 1–2)
ALP LIVER SERPL-CCNC: 90 U/L
ALT SERPL-CCNC: 55 U/L
ANION GAP SERPL CALC-SCNC: 5 MMOL/L (ref 0–18)
AST SERPL-CCNC: 37 U/L (ref 15–37)
BASOPHILS # BLD AUTO: 0.03 X10(3) UL (ref 0–0.2)
BASOPHILS NFR BLD AUTO: 0.5 %
BILIRUB SERPL-MCNC: 0.6 MG/DL (ref 0.1–2)
BUN BLD-MCNC: 14 MG/DL (ref 9–23)
CALCIUM BLD-MCNC: 9.3 MG/DL (ref 8.5–10.1)
CHLORIDE SERPL-SCNC: 108 MMOL/L (ref 98–112)
CHOLEST SERPL-MCNC: 179 MG/DL (ref ?–200)
CO2 SERPL-SCNC: 25 MMOL/L (ref 21–32)
CREAT BLD-MCNC: 0.75 MG/DL
EGFRCR SERPLBLD CKD-EPI 2021: 95 ML/MIN/1.73M2 (ref 60–?)
EOSINOPHIL # BLD AUTO: 0.15 X10(3) UL (ref 0–0.7)
EOSINOPHIL NFR BLD AUTO: 2.6 %
ERYTHROCYTE [DISTWIDTH] IN BLOOD BY AUTOMATED COUNT: 13.8 %
EST. AVERAGE GLUCOSE BLD GHB EST-MCNC: 128 MG/DL (ref 68–126)
FASTING PATIENT LIPID ANSWER: YES
FASTING STATUS PATIENT QL REPORTED: YES
GLOBULIN PLAS-MCNC: 4 G/DL (ref 2.8–4.4)
GLUCOSE BLD-MCNC: 105 MG/DL (ref 70–99)
HBA1C MFR BLD: 6.1 % (ref ?–5.7)
HCT VFR BLD AUTO: 43 %
HDLC SERPL-MCNC: 81 MG/DL (ref 40–59)
HGB BLD-MCNC: 13.9 G/DL
IMM GRANULOCYTES # BLD AUTO: 0.01 X10(3) UL (ref 0–1)
IMM GRANULOCYTES NFR BLD: 0.2 %
LDLC SERPL CALC-MCNC: 87 MG/DL (ref ?–100)
LYMPHOCYTES # BLD AUTO: 2.02 X10(3) UL (ref 1–4)
LYMPHOCYTES NFR BLD AUTO: 35.6 %
MCH RBC QN AUTO: 29.5 PG (ref 26–34)
MCHC RBC AUTO-ENTMCNC: 32.3 G/DL (ref 31–37)
MCV RBC AUTO: 91.3 FL
MONOCYTES # BLD AUTO: 0.45 X10(3) UL (ref 0.1–1)
MONOCYTES NFR BLD AUTO: 7.9 %
NEUTROPHILS # BLD AUTO: 3.01 X10 (3) UL (ref 1.5–7.7)
NEUTROPHILS # BLD AUTO: 3.01 X10(3) UL (ref 1.5–7.7)
NEUTROPHILS NFR BLD AUTO: 53.2 %
NONHDLC SERPL-MCNC: 98 MG/DL (ref ?–130)
OSMOLALITY SERPL CALC.SUM OF ELEC: 287 MOSM/KG (ref 275–295)
PLATELET # BLD AUTO: 317 10(3)UL (ref 150–450)
POTASSIUM SERPL-SCNC: 3.8 MMOL/L (ref 3.5–5.1)
PROT SERPL-MCNC: 7.8 G/DL (ref 6.4–8.2)
RBC # BLD AUTO: 4.71 X10(6)UL
SODIUM SERPL-SCNC: 138 MMOL/L (ref 136–145)
TRIGL SERPL-MCNC: 59 MG/DL (ref 30–149)
VLDLC SERPL CALC-MCNC: 9 MG/DL (ref 0–30)
WBC # BLD AUTO: 5.7 X10(3) UL (ref 4–11)

## 2024-01-10 PROCEDURE — 80061 LIPID PANEL: CPT

## 2024-01-10 PROCEDURE — 80053 COMPREHEN METABOLIC PANEL: CPT

## 2024-01-10 PROCEDURE — 88300 SURGICAL PATH GROSS: CPT

## 2024-01-10 PROCEDURE — 84443 ASSAY THYROID STIM HORMONE: CPT

## 2024-01-10 PROCEDURE — 82365 CALCULUS SPECTROSCOPY: CPT

## 2024-01-10 PROCEDURE — 83036 HEMOGLOBIN GLYCOSYLATED A1C: CPT

## 2024-01-10 PROCEDURE — 84439 ASSAY OF FREE THYROXINE: CPT

## 2024-01-10 PROCEDURE — 85025 COMPLETE CBC W/AUTO DIFF WBC: CPT

## 2024-01-12 LAB
T4 FREE SERPL-MCNC: 1 NG/DL (ref 0.8–1.7)
TSI SER-ACNC: 1.8 MIU/ML (ref 0.36–3.74)

## 2024-01-16 ENCOUNTER — HOSPITAL ENCOUNTER (OUTPATIENT)
Dept: MAMMOGRAPHY | Age: 54
Discharge: HOME | End: 2024-01-16
Attending: FAMILY MEDICINE
Payer: COMMERCIAL

## 2024-01-16 DIAGNOSIS — Z12.31 BREAST CANCER SCREENING BY MAMMOGRAM: ICD-10-CM

## 2024-01-16 PROCEDURE — 77063 BREAST TOMOSYNTHESIS BI: CPT | Performed by: FAMILY MEDICINE

## 2024-01-16 PROCEDURE — 77067 SCR MAMMO BI INCL CAD: CPT | Performed by: FAMILY MEDICINE

## 2024-01-18 LAB
CAOX DIHYDRATE: 50 %
CAOX MONOHYDRATE: 10 %
HYDROXYAPATITE: 40 %
WEIGHT-STONE: 35 MG

## 2024-02-09 ENCOUNTER — LAB ENCOUNTER (OUTPATIENT)
Dept: LAB | Age: 54
End: 2024-02-09
Payer: COMMERCIAL

## 2024-02-09 DIAGNOSIS — N20.0 KIDNEY STONE: ICD-10-CM

## 2024-02-09 PROCEDURE — 82340 ASSAY OF CALCIUM IN URINE: CPT

## 2024-02-09 PROCEDURE — 82507 ASSAY OF CITRATE: CPT

## 2024-02-09 PROCEDURE — 83986 ASSAY PH BODY FLUID NOS: CPT

## 2024-02-09 PROCEDURE — 84133 ASSAY OF URINE POTASSIUM: CPT

## 2024-02-09 PROCEDURE — 84105 ASSAY OF URINE PHOSPHORUS: CPT

## 2024-02-09 PROCEDURE — 83945 ASSAY OF OXALATE: CPT

## 2024-02-09 PROCEDURE — 84300 ASSAY OF URINE SODIUM: CPT

## 2024-02-09 PROCEDURE — 84392 ASSAY OF URINE SULFATE: CPT

## 2024-02-09 PROCEDURE — 82570 ASSAY OF URINE CREATININE: CPT

## 2024-02-09 PROCEDURE — 83735 ASSAY OF MAGNESIUM: CPT

## 2024-02-09 PROCEDURE — 84560 ASSAY OF URINE/URIC ACID: CPT

## 2024-02-09 PROCEDURE — 82436 ASSAY OF URINE CHLORIDE: CPT

## 2024-02-09 PROCEDURE — 36415 COLL VENOUS BLD VENIPUNCTURE: CPT

## 2024-02-09 PROCEDURE — 83935 ASSAY OF URINE OSMOLALITY: CPT

## 2024-06-23 ENCOUNTER — APPOINTMENT (OUTPATIENT)
Dept: GENERAL RADIOLOGY | Facility: HOSPITAL | Age: 54
DRG: 603 | End: 2024-06-23
Attending: EMERGENCY MEDICINE

## 2024-06-23 ENCOUNTER — OFFICE VISIT (OUTPATIENT)
Dept: FAMILY MEDICINE CLINIC | Facility: CLINIC | Age: 54
End: 2024-06-23

## 2024-06-23 ENCOUNTER — HOSPITAL ENCOUNTER (INPATIENT)
Facility: HOSPITAL | Age: 54
LOS: 1 days | Discharge: HOME | DRG: 603 | End: 2024-06-24
Attending: EMERGENCY MEDICINE | Admitting: HOSPITALIST

## 2024-06-23 VITALS
WEIGHT: 218 LBS | DIASTOLIC BLOOD PRESSURE: 70 MMHG | HEART RATE: 81 BPM | RESPIRATION RATE: 16 BRPM | OXYGEN SATURATION: 96 % | SYSTOLIC BLOOD PRESSURE: 122 MMHG | BODY MASS INDEX: 37 KG/M2 | TEMPERATURE: 98 F

## 2024-06-23 DIAGNOSIS — S61.451A DOG BITE OF RIGHT HAND, INITIAL ENCOUNTER: ICD-10-CM
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
## 2024-06-23 DIAGNOSIS — Z20.822 EXPOSURE TO COVID-19 VIRUS: Primary | ICD-10-CM
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
## 2024-06-23 DIAGNOSIS — A28.0 PASTEURELLA CELLULITIS DUE TO DOG BITE: Primary | ICD-10-CM

## 2024-06-23 LAB
ALBUMIN SERPL-MCNC: 3.8 G/DL (ref 3.4–5)
ALBUMIN/GLOB SERPL: 0.9 {RATIO} (ref 1–2)
ALP LIVER SERPL-CCNC: 102 U/L
ALT SERPL-CCNC: 31 U/L
ANION GAP SERPL CALC-SCNC: 6 MMOL/L (ref 0–18)
AST SERPL-CCNC: 19 U/L (ref 15–37)
BASOPHILS # BLD AUTO: 0.02 X10(3) UL (ref 0–0.2)
BASOPHILS NFR BLD AUTO: 0.3 %
BILIRUB SERPL-MCNC: 0.5 MG/DL (ref 0.1–2)
BUN BLD-MCNC: 15 MG/DL (ref 9–23)
CALCIUM BLD-MCNC: 9.4 MG/DL (ref 8.5–10.1)
CHLORIDE SERPL-SCNC: 107 MMOL/L (ref 98–112)
CO2 SERPL-SCNC: 24 MMOL/L (ref 21–32)
CREAT BLD-MCNC: 0.8 MG/DL
EGFRCR SERPLBLD CKD-EPI 2021: 88 ML/MIN/1.73M2 (ref 60–?)
EOSINOPHIL # BLD AUTO: 0.03 X10(3) UL (ref 0–0.7)
EOSINOPHIL NFR BLD AUTO: 0.5 %
ERYTHROCYTE [DISTWIDTH] IN BLOOD BY AUTOMATED COUNT: 13.7 %
GLOBULIN PLAS-MCNC: 4.3 G/DL (ref 2.8–4.4)
GLUCOSE BLD-MCNC: 100 MG/DL (ref 70–99)
HCT VFR BLD AUTO: 42.2 %
HGB BLD-MCNC: 14.4 G/DL
IMM GRANULOCYTES # BLD AUTO: 0.01 X10(3) UL (ref 0–1)
IMM GRANULOCYTES NFR BLD: 0.2 %
LYMPHOCYTES # BLD AUTO: 1.45 X10(3) UL (ref 1–4)
LYMPHOCYTES NFR BLD AUTO: 23.5 %
MCH RBC QN AUTO: 30.2 PG (ref 26–34)
MCHC RBC AUTO-ENTMCNC: 34.1 G/DL (ref 31–37)
MCV RBC AUTO: 88.5 FL
MONOCYTES # BLD AUTO: 0.48 X10(3) UL (ref 0.1–1)
MONOCYTES NFR BLD AUTO: 7.8 %
NEUTROPHILS # BLD AUTO: 4.19 X10 (3) UL (ref 1.5–7.7)
NEUTROPHILS # BLD AUTO: 4.19 X10(3) UL (ref 1.5–7.7)
NEUTROPHILS NFR BLD AUTO: 67.7 %
OPERATOR ID: NORMAL
OSMOLALITY SERPL CALC.SUM OF ELEC: 285 MOSM/KG (ref 275–295)
PLATELET # BLD AUTO: 289 10(3)UL (ref 150–450)
POTASSIUM SERPL-SCNC: 4.1 MMOL/L (ref 3.5–5.1)
PROT SERPL-MCNC: 8.1 G/DL (ref 6.4–8.2)
RAPID SARS-COV-2 BY PCR: NOT DETECTED
RBC # BLD AUTO: 4.77 X10(6)UL
SODIUM SERPL-SCNC: 137 MMOL/L (ref 136–145)
WBC # BLD AUTO: 6.2 X10(3) UL (ref 4–11)

## 2024-06-23 PROCEDURE — 73130 X-RAY EXAM OF HAND: CPT | Performed by: EMERGENCY MEDICINE

## 2024-06-23 PROCEDURE — 3074F SYST BP LT 130 MM HG: CPT | Performed by: FAMILY MEDICINE

## 2024-06-23 PROCEDURE — 99223 1ST HOSP IP/OBS HIGH 75: CPT | Performed by: HOSPITALIST

## 2024-06-23 PROCEDURE — 99215 OFFICE O/P EST HI 40 MIN: CPT | Performed by: FAMILY MEDICINE

## 2024-06-23 PROCEDURE — U0002 COVID-19 LAB TEST NON-CDC: HCPCS | Performed by: FAMILY MEDICINE

## 2024-06-23 PROCEDURE — 3078F DIAST BP <80 MM HG: CPT | Performed by: FAMILY MEDICINE

## 2024-06-23 RX ORDER — PROCHLORPERAZINE EDISYLATE 5 MG/ML
5 INJECTION INTRAMUSCULAR; INTRAVENOUS EVERY 8 HOURS PRN
Status: DISCONTINUED | OUTPATIENT
Start: 2024-06-23 | End: 2024-06-24

## 2024-06-23 RX ORDER — BISACODYL 10 MG
10 SUPPOSITORY, RECTAL RECTAL
Status: DISCONTINUED | OUTPATIENT
Start: 2024-06-23 | End: 2024-06-24

## 2024-06-23 RX ORDER — TAMSULOSIN HYDROCHLORIDE 0.4 MG/1
0.4 CAPSULE ORAL EVERY EVENING
Status: DISCONTINUED | OUTPATIENT
Start: 2024-06-23 | End: 2024-06-23

## 2024-06-23 RX ORDER — ACETAMINOPHEN 500 MG
1000 TABLET ORAL EVERY 4 HOURS PRN
Status: DISCONTINUED | OUTPATIENT
Start: 2024-06-23 | End: 2024-06-24

## 2024-06-23 RX ORDER — ECHINACEA PURPUREA EXTRACT 125 MG
1 TABLET ORAL
Status: DISCONTINUED | OUTPATIENT
Start: 2024-06-23 | End: 2024-06-24

## 2024-06-23 RX ORDER — SENNOSIDES 8.6 MG
17.2 TABLET ORAL NIGHTLY PRN
Status: DISCONTINUED | OUTPATIENT
Start: 2024-06-23 | End: 2024-06-24

## 2024-06-23 RX ORDER — MELATONIN
3 NIGHTLY PRN
Status: DISCONTINUED | OUTPATIENT
Start: 2024-06-23 | End: 2024-06-24

## 2024-06-23 RX ORDER — ONDANSETRON 2 MG/ML
4 INJECTION INTRAMUSCULAR; INTRAVENOUS EVERY 6 HOURS PRN
Status: DISCONTINUED | OUTPATIENT
Start: 2024-06-23 | End: 2024-06-24

## 2024-06-23 RX ORDER — ENOXAPARIN SODIUM 100 MG/ML
40 INJECTION SUBCUTANEOUS DAILY
Status: DISCONTINUED | OUTPATIENT
Start: 2024-06-23 | End: 2024-06-24

## 2024-06-23 RX ORDER — ENEMA 19; 7 G/133ML; G/133ML
1 ENEMA RECTAL ONCE AS NEEDED
Status: DISCONTINUED | OUTPATIENT
Start: 2024-06-23 | End: 2024-06-24

## 2024-06-23 RX ORDER — POLYETHYLENE GLYCOL 3350 17 G/17G
17 POWDER, FOR SOLUTION ORAL DAILY PRN
Status: DISCONTINUED | OUTPATIENT
Start: 2024-06-23 | End: 2024-06-24

## 2024-06-23 NOTE — PROGRESS NOTES
Hamida Atwood is a 53 year old female.    S:  Patient presents today with the following concerns:  Chief Complaint   Patient presents with    Bite     Dog bite 6/20/24   Bit on right hand trying to separate her 2 dogs that were fighting over a toy.  They are a mother and daughter.  Up to date on vaccinations.  Lost middle nail.  Was seen in ED at [facility] 6/21/2024.  Was placed on Augmentin and given tetanus booster.  No irrigation was performed.  Notes this morning noticed a bit of drainage from the palmar bite and opened it and pulled some \"black skin\" out with tweezers.  No pus.  Area of redness is receding inward away from marker that was placed in ED.    Exposed to Covid 1 week ago.  Started feeling headache and achy yesterday.  Slight cough.  No vomiting or diarrhea.  No sore throat.  Feels upset stomach.  No fevers.  Does not feel well.      Current Outpatient Medications   Medication Sig Dispense Refill    tamsulosin 0.4 MG Oral Cap Take 1 capsule (0.4 mg total) by mouth every evening. 90 capsule 0    Eletriptan Hydrobromide (RELPAX) 20 MG Oral Tab Take 1 tablet (20 mg total) by mouth as needed. 9 tablet 5    EVENING PRIMROSE OIL OR Take 1 tablet by mouth daily.      Cholecalciferol (VITAMIN D-3 OR) Take 1 tablet by mouth daily.      Multiple Vitamin (MULTI-VITAMIN DAILY OR) Take  by mouth.      Linolenic Acid (OMEGA 3 OR) Take  by mouth.       Patient Active Problem List   Diagnosis    Migraines    Anxiety and depression    Vitamin D deficiency    Chronic knee pain    Class 2 obesity due to excess calories without serious comorbidity in adult    Prediabetes    GERD (gastroesophageal reflux disease)    Periorificial dermatitis     Family History   Problem Relation Age of Onset    Other (Kidney stones) Father     Colon Polyps Father     Cancer Father         Mycosis fungoides    Allergies Mother         hayfever    Heart Attack Maternal Grandmother     Lung Disorder Maternal Grandfather     Stroke  Paternal Grandmother     Psychiatric Paternal Grandfather     Other (Kidney stones) Brother     Other (Kidney stones) Other         uncles    Allergies Sister         hayfever    Ovarian Cancer Maternal Aunt     Breast Cancer Neg     Colon Cancer Neg     Ovarian Cancer Neg        REVIEW OF SYSTEMS:  GENERAL: feels unwell  SKIN: see above.  EYES:denies vision change  LUNGS: denies shortness of breath with exertion  CARDIOVASCULAR: denies chest pain on exertion  GI: denies abdominal pain.  No N/V/D/C  : denies dysuria  MUSCULOSKELETAL: achy  NEURO: headaches    EXAM:  /70   Pulse 81   Temp 98.4 °F (36.9 °C)   Resp 16   Wt 218 lb (98.9 kg)   LMP 08/01/2022   SpO2 96%   BMI 37.42 kg/m²   Physical Exam  Constitutional:       General: She is not in acute distress.     Appearance: Normal appearance. She is not ill-appearing, toxic-appearing or diaphoretic.   HENT:      Head: Normocephalic and atraumatic.      Right Ear: Tympanic membrane and ear canal normal.      Left Ear: Tympanic membrane and ear canal normal.      Nose: Nose normal.      Mouth/Throat:      Mouth: Mucous membranes are moist.      Pharynx: Oropharynx is clear.   Eyes:      Extraocular Movements: Extraocular movements intact.      Conjunctiva/sclera: Conjunctivae normal.      Pupils: Pupils are equal, round, and reactive to light.   Cardiovascular:      Rate and Rhythm: Normal rate and regular rhythm.      Heart sounds: Normal heart sounds.   Pulmonary:      Effort: Pulmonary effort is normal.      Breath sounds: Normal breath sounds.   Musculoskeletal:      Cervical back: No rigidity or tenderness.   Lymphadenopathy:      Cervical: No cervical adenopathy.   Skin:     General: Skin is warm and dry.      Comments: Right hand with bite of the palm.  Somewhat open.  Clear discharge-scant.  Slight erythema surrounding.  Has receded from marked area.  On dorsal aspect of hand wounds healing well.  No fingernail middle finger.  Scabbed.  No  evidence of infection.   Neurological:      General: No focal deficit present.      Mental Status: She is alert and oriented to person, place, and time.   Psychiatric:         Mood and Affect: Mood normal.         Behavior: Behavior normal.      See below:  right hand            ASSESSMENT AND PLAN:  Hamida Atwood is a 53 year old female.  Encounter Diagnoses   Name Primary?    Exposure to COVID-19 virus Yes    Dog bite of right hand, initial encounter        No results found.     Orders Placed This Encounter   Procedures    Rapid Covid-19     Meds & Refills for this Visit:  Requested Prescriptions      No prescriptions requested or ordered in this encounter     Imaging & Consults:  None  Rapid Covid test is Negative.      Patient is feeling unwell with recent dog bite.    Her exam is reassuring but due to fact that this involves her hand and is now experiencing systemic symptoms I think would be a good idea to be seen in ED for evaluation.  Patient is in agreement.    She will go to the [facility] in [place] for evaluation.      She left the clinic in stable condition.  No follow-ups on file.

## 2024-06-23 NOTE — ED INITIAL ASSESSMENT (HPI)
Pt here for evaluation of feeling sick s/p dogbite  Per pt, \"our dogs got into a fight on Thursday and while I was breaking it up, they bit my right hand. We were at [facility] and gave me an oral antibiotic. They didn't irrigate it. I just don't feel well I'm achy, tired, and have had a headache. When we went to the walk in clinic, I did a covid test but that was negative. I don't know if it's the infection.\"    Pt presents alert, orientedx4, easy WOB, complains of some stiffness in right hand  +redness, swelling and drainage to right hand, caprefil<3sec and full sensation  Lungs clear A/P bilaterally  Abd soft,NT,ND,+Bsx4  Skin pink, warm, well perfused

## 2024-06-23 NOTE — CONSULTS
Orthopaedic Surgery  [address] [place] [id number]  [phone]     NEW PATIENT VISIT - HISTORY AND PHYSICAL EXAMINATION     Name: Hamida Atwood   MRN: [id number]  Date: 2024     CC: Right hand pain / dog bite    HPI:   Hamida Atwood is a very pleasant 53 year old right-hand dominant female with PMH of anxiety / Depression and migraines who presents today for evaluation of right hand pain and cellulitis ongoing since  when she sustained a dog bit from her dog. This was an accidental injury when she was breaking up a conflict for the dog.     She was initially evaluated at urgent care of  and started on Augmentin, presented to ED today  due to persistent pain and swelling. No specific fevers / chills. No ascending redness / streaks.     Nonsmoker. Borderline diabetic with recent A1c of 6.1 (2024).     PMH:   Past Medical History:    Abdominal wall lump    right upper quadrant under right ribs at mid clavicular line.     Abnormal mammogram    Anxiety and depression    Atopic dermatitis    Breast density    asymmetric nodular density in the R breast    Chronic knee pain    Class 2 obesity due to excess calories without serious comorbidity in adult    Decorative tattoo    Right leg above ankle    Fatigue    Due to lack of sleep    Flatulence/gas pain/belching    More as I get older    Headache disorder    Migraines. 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    Lumbago    chronic    Migraines    Obesity    Prediabetes    Sleep disturbance    Insomnia related to pre-menopause    Stress    Stress reaction    Vitamin D deficiency       PAST SURGICAL HX:  Past Surgical History:   Procedure Laterality Date    Anesthesia for;  procedures      Breast surgery procedure unlisted      breast lump removed, benign finding    Colonoscopy  2020     1 polyp removed    Verito localization wire 1 site right (cpt=19281)            Tubal ligation         FAMILY HX:  Family History   Problem Relation Age of Onset    Other (Kidney stones) Father     Colon Polyps Father     Cancer Father         Mycosis fungoides    Allergies Mother         hayfever    Heart Attack Maternal Grandmother     Lung Disorder Maternal Grandfather     Stroke Paternal Grandmother     Psychiatric Paternal Grandfather     Other (Kidney stones) Brother     Other (Kidney stones) Other         uncles    Allergies Sister         hayfever    Ovarian Cancer Maternal Aunt     Breast Cancer Neg     Colon Cancer Neg     Ovarian Cancer Neg        ALLERGIES:  Neomycin    MEDICATIONS:   No current outpatient medications on file.       ROS: A complete 10 point review of systems performed and negative aside from the aforementioned per history of present illness.     SOCIAL HX:  Social History     Tobacco Use    Smoking status: Former     Current packs/day: 0.00     Types: Cigarettes     Quit date: 2010     Years since quittin.3    Smokeless tobacco: Never   Substance Use Topics    Alcohol use: Yes     Comment: 1-2 per month       PE:   Vitals:    24 1146 24 1400 24 1432 24 1559   BP: 132/84 128/80 116/66    BP Location:   Left arm    Pulse: 78 77 66    Resp: 18 17 17    Temp: 100.1 °F (37.8 °C)  98.8 °F (37.1 °C)    TempSrc: Temporal  Oral    SpO2: 99% 100%     Weight:    218 lb (98.9 kg)     Estimated body mass index is 37.42 kg/m² as calculated from the following:    Height as of 23: 5' 4\" (1.626 m).    Weight as of this encounter: 218 lb (98.9 kg).    Physical Exam  Constitutional:       Appearance: Normal appearance.   HENT:      Head: Normocephalic and atraumatic.   Eyes:      Extraocular Movements: Extraocular movements intact.   Neck:      Musculoskeletal: Normal range of motion and neck supple.   Cardiovascular:      Pulses: Normal pulses.   Pulmonary:       Effort: Pulmonary effort is normal. No respiratory distress.   Abdominal:      General: There is no distension.   Skin:     General: Skin is warm.      Capillary Refill: Capillary refill takes less than 2 seconds.      Findings: No bruising.   Neurological:      General: No focal deficit present.      Mental Status: Alert.   Psychiatric:         Mood and Affect: Mood normal.     Examination of the right hand demonstrates:   Skin is intact, warm and dry.     Two puncture wounds on dorsum and volar aspect of the the 3rd metacarpal without drainage. Slight surrounding erythema without proximal extension. Able to form a composite fist with slight limitation due to swelling at location of bites.     No fluctuance to localized fluid collection. Mildly tender to palpation.       Neurovascular Upper Extremity (Bilateral)  Motor:    5/5 EPL, Finger Abduction, , Pinch,   Sensation:   intact to light touch median, ulnar, radial nerve  Circulation:   Normal, 2+ radial pulse    The contralateral upper extremity is without limitation in range of motion or strength, no positive provocative maneuvers.         Radiographic Examination/Diagnostics:    Hand XR personally viewed, independently interpreted and radiology report was reviewed.    XR HAND (MIN 3 VIEWS), RIGHT (CPT=73130)    Result Date: 6/23/2024            PROCEDURE:  XR HAND (MIN 3 VIEWS), RIGHT (CPT=73130)  TECHNIQUE:  Three views of the right hand were obtained.  COMPARISON:  None.  INDICATIONS:  dog bite  PATIENT STATED HISTORY: (As transcribed by Technologist)  Patient states she has a dog bite and now has right hand abrasions as well as missing a fingernail on her third digit.   FINDINGS:  No fracture or dislocation.  Joint spaces are relatively well maintained.  No bone abnormality.  IMPRESSION:  Unremarkable radiographs of the right hand.   LOCATION:  [facility]   Dictated by (CST): Raheem Still MD on 6/23/2024 at 1:43 PM     Finalized by (CST):  Raheem Still MD on 6/23/2024 at 1:44 PM         IMPRESSION: Hamida Atwood is a 53 year old Right hand dominant female with soft tissue infection of the right hand as a consequence of dog bite on 6/20/24.     Refractory to oral Augmentin since 6/21. I recommend IV antibiotic therapy with Unasyn and close observation. No current surgical indication.     PLAN:   We had a detailed discussion outlining the etiology, anatomy, pathophysiology, and natural history of dog bit infections. Imaging was reviewed in detail.    Patient likely to improve and merit discharge on oral antibiotics in 24-48 hours. No current indication for surgical intervention. May benefit from ID consultation to guide antibiotic management if clinically refractory.     FOLLOW-UP:   May follow up with PCP after discharge unless symptoms persist. May benefit from short course of OT if finger stiffness remains.       Chloé Barillas MD  Knee, Shoulder, & Elbow Surgery / Sports Medicine Specialist  Orthopaedic Surgery  [address] [place] [address] [id number] [email or web address]  [email or web address]  t: [phone]  o: [phone]  f: [phone]    This note was dictated using Dragon software.  While it was briefly proofread prior to completion, some grammatical, spelling, and word choice errors due to dictation may still occur.

## 2024-06-23 NOTE — H&P
[facility]     Hamida Atwood Patient Status:  Inpatient    1970 MRN [id number]   Location [facility] 3SW-A Attending Darryn Spencer MD   Hosp Day # 0 PCP Tawnya Rockwell DO     Chief Complaint:   Chief Complaint   Patient presents with    Cellulitis    Bite       Subjective:    History of Present Illness:     Hamida Atwood is a 53 year old female with a past medical history of anxiety/depression and migraines.  She was bitten on the right hand by her dog.  On urgent care  was started on Augmentin.  She says she has felt well.  She comes emergency room secondary to persistent erythema had a fever in the emergency room.    History/Other:    Past Medical History:  Past Medical History:    Abdominal wall lump    right upper quadrant under right ribs at mid clavicular line.     Abnormal mammogram    Anxiety and depression    Atopic dermatitis    Breast density    asymmetric nodular density in the R breast    Chronic knee pain    Class 2 obesity due to excess calories without serious comorbidity in adult    Decorative tattoo    Right leg above ankle    Fatigue    Due to lack of sleep    Flatulence/gas pain/belching    More as I get older    Headache disorder    Migraines. 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    Lumbago    chronic    Migraines    Obesity    Prediabetes    Sleep disturbance    Insomnia related to pre-menopause    Stress    Stress reaction    Vitamin D deficiency     Past Surgical History:   Past Surgical History:   Procedure Laterality Date    Anesthesia for;  procedures      Breast surgery procedure unlisted      breast lump removed, benign finding    Colonoscopy  2020    1 polyp removed    Verito localization wire 1 site right (cpt=19281)            Tubal ligation        Family History:    Family History   Problem Relation Age of Onset    Other (Kidney stones) Father     Colon Polyps Father     Cancer Father         Mycosis fungoides    Allergies Mother         hayfever    Heart Attack Maternal Grandmother     Lung Disorder Maternal Grandfather     Stroke Paternal Grandmother     Psychiatric Paternal Grandfather     Other (Kidney stones) Brother     Other (Kidney stones) Other         uncles    Allergies Sister         hayfever    Ovarian Cancer Maternal Aunt     Breast Cancer Neg     Colon Cancer Neg     Ovarian Cancer Neg      Social History:    reports that she quit smoking about 14 years ago. Her smoking use included cigarettes. She has never used smokeless tobacco. She reports current alcohol use. She reports that she does not use drugs.     Allergies:   Allergies   Allergen Reactions    Neomycin SWELLING     Ear swollen, severe rash       Medications:    No current facility-administered medications on file prior to encounter.     Current Outpatient Medications on File Prior to Encounter   Medication Sig Dispense Refill    tamsulosin 0.4 MG Oral Cap Take 1 capsule (0.4 mg total) by mouth every evening. 90 capsule 0    Eletriptan Hydrobromide (RELPAX) 20 MG Oral Tab Take 1 tablet (20 mg total) by mouth as needed. 9 tablet 5    EVENING PRIMROSE OIL OR Take 1 tablet by mouth daily.      Cholecalciferol (VITAMIN D-3 OR) Take 1 tablet by mouth daily.      Multiple Vitamin (MULTI-VITAMIN DAILY OR) Take  by mouth.      Linolenic Acid (OMEGA 3 OR) Take  by mouth.         Review of Systems:   A comprehensive review of systems was completed.    Pertinent positives and negatives noted in the HPI.    Objective:   Physical Exam:    /66 (BP Location: Left arm)   Pulse 66   Temp 98.8 °F (37.1 °C) (Oral)   Resp 17   LMP 08/01/2022   SpO2 100%   General: No acute distress, Alert  Respiratory: No rhonchi, no wheezes  Cardiovascular: S1, S2.   Abdomen: Soft, Non-tender, Non-distended, Positive bowel  sounds  Neuro: No new focal deficits  Extremities: right hand lacerations healing with mild swelling and eryhthema    Results:    Labs:      Labs Last 24 Hours:  Recent Labs   Lab 06/23/24  1214   WBC 6.2   HGB 14.4   MCV 88.5   .0       Recent Labs   Lab 06/23/24  1214   *   BUN 15   CREATSERUM 0.80   CA 9.4   ALB 3.8      K 4.1      CO2 24.0   ALKPHO 102   AST 19   ALT 31   BILT 0.5   TP 8.1       CrCl cannot be calculated (Unknown ideal weight.).    No results for input(s): \"TROP\", \"TROPHS\", \"CK\" in the last 168 hours.    No results for input(s): \"PTP\", \"INR\" in the last 168 hours.    No results for input(s): \"TROP\", \"CK\" in the last 168 hours.      Imaging: Imaging data reviewed in Epic.    Assessment & Plan:      #Hand cellulitis secondary to dog bite  Unasyn  Orthopedic evaluation          Plan of care discussed with ED physician    Darryn Spencer MD    Supplementary Documentation:     The 21st Century Cures Act makes medical notes like these available to patients in the interest of transparency. Please be advised this is a medical document. Medical documents are intended to carry relevant information, facts as evident, and the clinical opinion of the practitioner. The medical note is intended as peer to peer communication and may appear blunt or direct. It is written in medical language and may contain abbreviations or verbiage that are unfamiliar.

## 2024-06-23 NOTE — PLAN OF CARE
Problem: Patient/Family Goals  Goal: Patient/Family Short Term Goal  Description: Patient's Short Term Goal: Will respond to IV antibiotic    Interventions:   - No IV antibiotic reaction.  - See additional Care Plan goals for specific interventions  Outcome: Progressing     Problem: PAIN - ADULT  Goal: Verbalizes/displays adequate comfort level or patient's stated pain goal  Description: INTERVENTIONS:  - Encourage pt to monitor pain and request assistance  - Assess pain using appropriate pain scale  - Administer analgesics based on type and severity of pain and evaluate response  - Implement non-pharmacological measures as appropriate and evaluate response  - Consider cultural and social influences on pain and pain management  - Manage/alleviate anxiety  - Utilize distraction and/or relaxation techniques  - Monitor for opioid side effects  - Notify MD/LIP if interventions unsuccessful or patient reports new pain  - Anticipate increased pain with activity and pre-medicate as appropriate  Outcome: Progressing

## 2024-06-23 NOTE — ED PROVIDER NOTES
Patient Seen in: [facility] [place] Emergency Department      History     Chief Complaint   Patient presents with    Cellulitis    Bite     Stated Complaint: dog bite    Subjective:   HPI    53-year-old right handed with a history of migraines, \"prediabetes\" presents for evaluation of an infected dog bite.  On 6/20 she went to break up a \"tussle\" between her 2 dogs and she was bit on the right hand.  She was seen at urgent care on 6/21 and started on Augmentin.  However since last night she started not feeling well, thought she may be coming down with COVID.  Feels fatigued He.  Went to the walk-in clinic today and had a COVID test.  Was advised to come here for further evaluation.  Today also noted some increased redness and swelling on the palmar aspect of her hand where there is a laceration and she had some drainage from that area.  Has difficulty fully extending her right middle finger.  Records reviewed including urgent care visit note from 6/21.  Patient was prescribed Augmentin and given a tetanus booster.  Objective:   Past Medical History:    Abdominal wall lump    right upper quadrant under right ribs at mid clavicular line.     Abnormal mammogram    Anxiety and depression    Atopic dermatitis    Breast density    asymmetric nodular density in the R breast    Chronic knee pain    Class 2 obesity due to excess calories without serious comorbidity in adult    Decorative tattoo    Right leg above ankle    Fatigue    Due to lack of sleep    Flatulence/gas pain/belching    More as I get older    Headache disorder    Migraines. 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    Lumbago    chronic    Migraines    Obesity    Prediabetes    Sleep disturbance    Insomnia related to pre-menopause    Stress    Stress reaction    Vitamin D deficiency              Past Surgical History:   Procedure  Laterality Date    Anesthesia for;  procedures      Breast surgery procedure unlisted      breast lump removed, benign finding    Colonoscopy  2020    1 polyp removed    Verito localization wire 1 site right (cpt=19281)            Tubal ligation                  Social History     Socioeconomic History    Marital status:    Tobacco Use    Smoking status: Former     Current packs/day: 0.00     Types: Cigarettes     Quit date: 2010     Years since quittin.3    Smokeless tobacco: Never   Vaping Use    Vaping status: Never Used   Substance and Sexual Activity    Alcohol use: Yes     Comment: 1-2 per month    Drug use: No    Sexual activity: Yes     Partners: Male   Other Topics Concern    Caffeine Concern No    Sleep Concern No    Stress Concern No    Weight Concern No    Special Diet No    Exercise No    Seat Belt No    Self-Exams No     Social Determinants of Health      Received from [facility]    Housing Stability              Review of Systems    Positive for stated complaint: dog bite  Other systems are as noted in HPI.  Constitutional and vital signs reviewed.      All other systems reviewed and negative except as noted above.    Physical Exam     ED Triage Vitals [24 1146]   /84   Pulse 78   Resp 18   Temp 100.1 °F (37.8 °C)   Temp src Temporal   SpO2 99 %   O2 Device None (Room air)       Current Vitals:   Vital Signs  BP: 128/80  Pulse: 77  Resp: 17  Temp: 100.1 °F (37.8 °C)  Temp src: Temporal    Oxygen Therapy  SpO2: 100 %  O2 Device: None (Room air)            Physical Exam    General: Patient is awake alert no acute distress  Right upper extremity: There is erythema on the extensor surface around the  second through fourth MCP joints, emanating from a laceration in the space between the index and middle fingers.  The nail is missing from the middle finger.  On the palmar surface there is a 2 cm laceration just proximal to the  3rd MCP induration and scant purulent drainage.  The middle finger does not fully extend and is somewhat swollen.              ED Course     Labs Reviewed   COMP METABOLIC PANEL (14) - Abnormal; Notable for the following components:       Result Value    Glucose 100 (*)     A/G Ratio 0.9 (*)     All other components within normal limits   CBC WITH DIFFERENTIAL WITH PLATELET    Narrative:     The following orders were created for panel order CBC With Differential With Platelet.  Procedure                               Abnormality         Status                     ---------                               -----------         ------                     CBC W/ DIFFERENTIAL[[id number]]                              Final result                 Please view results for these tests on the individual orders.   BLOOD CULTURE   BLOOD CULTURE   CBC W/ DIFFERENTIAL     Right hand: I personally reviewed the radiographs and my individual interpretation shows no fracture.  I also reviewed the official report which showed no acute process.       Unasyn ordered         MDM          Previous records reviewed as noted in HPI    Differential includes, but is not limited to, abscess, cellulitis, flexor tenosynovitis    Review of any laboratory testing: CBC, CMP unremarkable.  Rapid COVID from earlier today was normal.     Review of any radiographic studies: X-ray unremarkable    Shared decision making with the patient.  Given location in the hand, failure of outpatient antibiotics and development of fever and worsening signs of infection patient will be admitted for hand surgery consultation.    Consultants utilized:   Dr. Spencer, hospitalist  Dr. Lombardi, orthopedics        Admission disposition: 6/23/2024  1:51 PM                                        Medical Decision Making      Disposition and Plan     Clinical Impression:  1. Pasteurella cellulitis due to dog bite         Disposition:  Admit  6/23/2024  1:51 pm    Follow-up:  No  follow-up provider specified.        Medications Prescribed:  Current Discharge Medication List                            Hospital Problems       Present on Admission  Date Reviewed: 6/23/2024            ICD-10-CM Noted POA    * (Principal) Pasteurella cellulitis due to dog bite A28.0, W54.0XXA 6/23/2024 Unknown

## 2024-06-23 NOTE — ED QUICK NOTES
Orders for admission, patient is aware of plan and ready to go upstairs. Any questions, please call ED RN azul at extension [phone].     Patient Covid vaccination status: Fully vaccinated     COVID Test Ordered in ED: None    COVID Suspicion at Admission: N/A    Running Infusions:      Mental Status/LOC at time of transport: A&Ox4    Other pertinent information: ortho consult, IV ABX  CIWA score: N/A   NIH score:  N/A

## 2024-06-23 NOTE — PROGRESS NOTES
NURSING ADMISSION NOTE      Patient admitted via Cart from ER.  Patient admitted due to right hand dog bite.    Oriented to room.  Received awake, alert and oriented x 4.    Safety precautions initiated.   Bed in low position.  Call light in reach.  Instructed to call for assistance.  Dr. Spencer seen and examined patinet, received orders.

## 2024-06-24 VITALS
HEART RATE: 72 BPM | SYSTOLIC BLOOD PRESSURE: 130 MMHG | WEIGHT: 218 LBS | DIASTOLIC BLOOD PRESSURE: 59 MMHG | OXYGEN SATURATION: 97 % | TEMPERATURE: 98 F | RESPIRATION RATE: 20 BRPM | BODY MASS INDEX: 37 KG/M2

## 2024-06-24 LAB
ANION GAP SERPL CALC-SCNC: 5 MMOL/L (ref 0–18)
BASOPHILS # BLD AUTO: 0.02 X10(3) UL (ref 0–0.2)
BASOPHILS NFR BLD AUTO: 0.4 %
BUN BLD-MCNC: 15 MG/DL (ref 9–23)
CALCIUM BLD-MCNC: 9 MG/DL (ref 8.5–10.1)
CHLORIDE SERPL-SCNC: 108 MMOL/L (ref 98–112)
CO2 SERPL-SCNC: 25 MMOL/L (ref 21–32)
CREAT BLD-MCNC: 0.67 MG/DL
EGFRCR SERPLBLD CKD-EPI 2021: 104 ML/MIN/1.73M2 (ref 60–?)
EOSINOPHIL # BLD AUTO: 0.14 X10(3) UL (ref 0–0.7)
EOSINOPHIL NFR BLD AUTO: 2.6 %
ERYTHROCYTE [DISTWIDTH] IN BLOOD BY AUTOMATED COUNT: 13.8 %
GLUCOSE BLD-MCNC: 96 MG/DL (ref 70–99)
HCT VFR BLD AUTO: 39.4 %
HGB BLD-MCNC: 12.8 G/DL
IMM GRANULOCYTES # BLD AUTO: 0.01 X10(3) UL (ref 0–1)
IMM GRANULOCYTES NFR BLD: 0.2 %
LYMPHOCYTES # BLD AUTO: 2.16 X10(3) UL (ref 1–4)
LYMPHOCYTES NFR BLD AUTO: 40.1 %
MAGNESIUM SERPL-MCNC: 2.2 MG/DL (ref 1.6–2.6)
MCH RBC QN AUTO: 30 PG (ref 26–34)
MCHC RBC AUTO-ENTMCNC: 32.5 G/DL (ref 31–37)
MCV RBC AUTO: 92.3 FL
MONOCYTES # BLD AUTO: 0.59 X10(3) UL (ref 0.1–1)
MONOCYTES NFR BLD AUTO: 10.9 %
NEUTROPHILS # BLD AUTO: 2.47 X10 (3) UL (ref 1.5–7.7)
NEUTROPHILS # BLD AUTO: 2.47 X10(3) UL (ref 1.5–7.7)
NEUTROPHILS NFR BLD AUTO: 45.8 %
OSMOLALITY SERPL CALC.SUM OF ELEC: 287 MOSM/KG (ref 275–295)
PLATELET # BLD AUTO: 262 10(3)UL (ref 150–450)
POTASSIUM SERPL-SCNC: 3.8 MMOL/L (ref 3.5–5.1)
RBC # BLD AUTO: 4.27 X10(6)UL
SODIUM SERPL-SCNC: 138 MMOL/L (ref 136–145)
WBC # BLD AUTO: 5.4 X10(3) UL (ref 4–11)

## 2024-06-24 PROCEDURE — 99239 HOSP IP/OBS DSCHRG MGMT >30: CPT | Performed by: HOSPITALIST

## 2024-06-24 RX ORDER — AMOXICILLIN AND CLAVULANATE POTASSIUM 875; 125 MG/1; MG/1
1 TABLET, FILM COATED ORAL 2 TIMES DAILY
Qty: 20 TABLET | Refills: 0 | Status: SHIPPED | OUTPATIENT
Start: 2024-06-24 | End: 2024-07-04

## 2024-06-24 NOTE — PLAN OF CARE
Pt a&O. On room air. Tolerating diet with good appetite. Had BM today. Voiding w/o difficulty. Pain managed with oral tylenol. Up independently. Warm soapy hand soak completed. Instruction/demonstration on range of motion to fingers/hand. Tolerating IV atbx as ordered. Pt ready for dc home this evening after late afternoon atbx. Po atbx Rx electronically sent to pt's [facility] pharmacy.

## 2024-06-24 NOTE — DISCHARGE INSTRUCTIONS
Continue warm soapy hand soaks two to three times daily  Make sure to do range of motion exercises to fingers and hand frequently.     Pain management: may alternate between motrin and tylenol as needed.    Elevate on pillows to reduce pain/swelling.     Notify your physician for:  1. Increased drainage or new drainage coming from incision.  2. Fevers greater than 101 on two consecutive readings taken 4 hours apart.  3. If wound starts to smell or turns red.  4. For pain not relieved by pain medications.  5. Persistent nausea or vomiting.  6. Any questions, problems, or concerns.

## 2024-06-24 NOTE — PAYOR COMM NOTE
--------------  ADMISSION REVIEW     Payor: GARRETT MORALES  Subscriber #:  [id number]  Authorization Number: [id number]    Admit date: 6/23/24  Admit time:  2:29 PM       Patient Seen in: [facility] Emergency Department  Chief Complaint   Patient presents with    Cellulitis    Bite     Stated Complaint: dog bite    53-year-old right handed with a history of migraines, \"prediabetes\" presents for evaluation of an infected dog bite.  On 6/20 she went to break up a \"tussle\" between her 2 dogs and she was bit on the right hand.  She was seen at urgent care on 6/21 and started on Augmentin.  However since last night she started not feeling well, thought she may be coming down with COVID.  Feels fatigued  Was advised to come here for further evaluation.  Today also noted some increased redness and swelling on the palmar aspect of her hand where there is a laceration and she had some drainage from that area.  Has difficulty fully extending her right middle finger.      ED Triage Vitals [06/23/24 1146]   /84   Pulse 78   Resp 18   Temp 100.1 °F (37.8 °C)   Temp src Temporal   SpO2 99 %   O2 Device None (Room air)       Current Vitals:   Vital Signs  BP: 128/80  Pulse: 77  Resp: 17  Temp: 100.1 °F (37.8 °C)  Temp src: Temporal    Oxygen Therapy  SpO2: 100 %  O2 Device: None (Room air)            Physical Exam    General: Patient is awake alert no acute distress  Right upper extremity: There is erythema on the extensor surface around the  second through fourth MCP joints, emanating from a laceration in the space between the index and middle fingers.  The nail is missing from the middle finger.  On the palmar surface there is a 2 cm laceration just proximal to the 3rd MCP induration and scant purulent drainage.  The middle finger does not fully extend and is somewhat swollen.    ED Course     Labs Reviewed   COMP METABOLIC PANEL (14) - Abnormal; Notable for the following components:       Result Value    Glucose 100 (*)      A/G Ratio 0.9 (*)     All other components within normal limits   CBC WITH DIFFERENTIAL WITH PLATELET    Narrative:     The following orders were created for panel order CBC With Differential With Platelet.  Procedure                               Abnormality         Status                     ---------                               -----------         ------                     CBC W/ DIFFERENTIAL[[id number]]                              Final result                 Please view results for these tests on the individual orders.   BLOOD CULTURE   BLOOD CULTURE   CBC W/ DIFFERENTIAL     Unasyn ordered    Disposition and Plan     Clinical Impression:  1. 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      Refractory to oral Augmentin since 6/21. I recommend IV antibiotic therapy with Unasyn               MEDICATIONS ADMINISTERED IN LAST 1 DAY:  acetaminophen (Tylenol Extra Strength) tab 1,000 mg       Date Action Dose Route User    6/24/2024 0755 Given 1,000 mg Oral Gary Mccullough RN          ampicillin-sulbactam (Unasyn) 3 g in sodium chloride 0.9% 100mL IVPB-ADD       Date Action Dose Route User    6/24/2024 1054 New Bag 3 g Intravenous Radha Donato RN    6/24/2024 0605 New Bag 3 g Intravenous Love Martinez RN    6/24/2024 0016 New Bag 3 g Intravenous Love Martinez RN    6/23/2024 1808 New Bag 3 g Intravenous Michelle Zamora RN          enoxaparin (Lovenox) 40 MG/0.4ML SUBQ injection 40 mg       Date Action Dose Route User    6/24/2024 0752 Given 40 mg Subcutaneous (Left Lower Abdomen) Gary Mccullough RN    6/23/2024 1606 Given 40 mg Subcutaneous (Left Lower Abdomen) Michelle Zamora RN            Vitals (last day)       Date/Time Temp Pulse Resp BP SpO2 Weight O2 Device O2 Flow Rate (L/min) [facility]    06/24/24  0816 98.3 °F (36.8 °C) 66 16 108/60 94 % -- None (Room air) -- LW    06/24/24 0425 98 °F (36.7 °C) 70 15 97/45 95 % -- None (Room air) -- SM    06/23/24 2048 98.3 °F (36.8 °C) 71 18 136/73 95 % -- None (Room air) -- SM    06/23/24 1559 -- -- -- -- -- 218 lb -- -- GR    06/23/24 1432 98.8 °F (37.1 °C) 66 17 116/66 -- -- None (Room air) -- JOSE    06/23/24 1400 -- 77 17 128/80 100 % -- None (Room air) -- DH    06/23/24 1218 -- -- -- -- -- -- None (Room air) -- SP    06/23/24 1146 100.1 °F (37.8 °C) 78 18 132/84 99 % -- None (Room air) -- RL          CIWA Scores (since admission)       None

## 2024-06-24 NOTE — PLAN OF CARE
Pt A&Ox4, VSS on RA. Pt denies pain at this time. Swelling and redness to R hand outlined and laceration open to air. Up ad-yasir. Plan to continue IV abx and possibly DC tomorrow. Call light in reach, safety measures in place.

## 2024-06-25 ENCOUNTER — PATIENT OUTREACH (OUTPATIENT)
Dept: CASE MANAGEMENT | Age: 54
End: 2024-06-25

## 2024-06-25 DIAGNOSIS — Z02.9 ENCOUNTERS FOR UNSPECIFIED ADMINISTRATIVE PURPOSE: Primary | ICD-10-CM

## 2024-06-25 NOTE — PROGRESS NOTES
Initial Post Discharge Follow Up   Discharge Date: 6/24/24  Contact Date: 6/25/2024    Consent Verification:  Assessment Completed With: Patient  HIPAA Verified?  Yes    Discharge Dx:   Dog bite induced cellulitis of right hand     General:   How have you been since your discharge from the hospital? NCM spoke with pt states is feeling okay. Pt reports slight nausea when taking the antibiotic, she is taking it with food. Pt reports her right hand symptoms are improving, less swelling. Pt denies any fevers, chills, nausea, vomiting, shortness of breath, chest pain or any others.    Do you have any pain since discharge?  No    How well was your pain managed while in the hospital?   On a scale of 1-5   1- Very Poor and 5- Very well   5  When you were leaving the hospital were your discharge instructions reviewed with you? Yes  How well were your discharge instructions explained to you?   On a scale of 1-5   1- Very Poor and 5- Very well   5  Do you have any questions about your discharge instructions?  No  Before leaving the hospital was your diagnoses explained to you? Yes  Do you have any questions about your diagnoses? No  Are you able to perform normal daily activities of living as you have prior to your hospital stay (dressing, bathing, ambulating to the bathroom, etc)? yes  (NCM) Was patient given a different diet per AVS? no      Medications: Reviewed medication list.  Medications are up to date.  Current Outpatient Medications   Medication Sig Dispense Refill    amoxicillin clavulanate 875-125 MG Oral Tab Take 1 tablet by mouth 2 (two) times daily for 10 days. 20 tablet 0    tamsulosin 0.4 MG Oral Cap Take 1 capsule (0.4 mg total) by mouth every evening. 90 capsule 0    Eletriptan Hydrobromide (RELPAX) 20 MG Oral Tab Take 1 tablet (20 mg total) by mouth as needed. 9 tablet 5    EVENING PRIMROSE OIL OR Take 1 tablet by mouth daily.      Cholecalciferol (VITAMIN D-3 OR) Take 1 tablet by mouth daily.      Multiple  Vitamin (MULTI-VITAMIN DAILY OR) Take  by mouth.      Linolenic Acid (OMEGA 3 OR) Take  by mouth.       Were there any changes to your current medication(s) noted on the AVS? Yes  If so, were these medication changes discussed with you prior to leaving the hospital? Yes  If a new medication was prescribed:    Was the new medication's purpose & side effects reviewed? Yes  Do you have any questions about your new medication? No  Did you  your discharge medications when you left the hospital? Yes  Let's go over your medications together to make sure we are not missing anything. Medications Reviewed  Are there any reasons that keep you from taking your medication as prescribed? No  Are you having any concerns with constipation? No      Discharge medications reviewed/discussed/and reconciled against outpatient medications with patient.  Any changes or updates to medications sent to PCP.  Patient Acknowledged     Referrals/orders at D/C:  Referrals/orders placed at D/C? no    DME ordered at D/C? No      Discharge orders, AVS reviewed and discussed with patient. Any changes or updates to orders sent to PCP.  Patient Acknowledged      SDOH:   Transportation Needs: No Transportation Needs (6/25/2024)    Transportation Needs     Lack of Transportation: No     Car Seat: Not on file     Financial Resource Strain: Low Risk  (6/25/2024)    Financial Resource Strain     Difficulty of Paying Living Expenses: Not hard at all     Med Affordability: Not on file         Follow up appointments:          TCC  Was TCC ordered: No      PCP (If no TCC appointment)  Does patient already have a PCP appointment scheduled? Yes  NC Scheduled PCP office TCM appointment with patient      Specialist    Does the patient have any other follow up appointment(s) needing to be scheduled? Yes  If yes: NC reviewed upcoming specialist appointment with patient: Yes  Does the patient need assistance scheduling appointment(s): No    Is there any  reason as to why you cannot make your appointment(s)?  No     Needs post D/C:   Now that you are home, are there any needs or concerns you need addressed before your next visit with your PCP?  (DME, meds, questions, etc.): No    Interventions by NCM:   All discharge instructions reviewed with the patient. Reviewed when to call MD vs when to call 911 or go the ED. Educated patient on the importance of taking all meds as prescribed as well as close f/u with PCP/specialists. Pt verbalized understanding and will contact the office with any further questions or concerns. Patient denies fevers, chills, nausea, vomiting, shortness of breath, chest pain, or any other symptoms at this time.  NCM provided contact information for any further questions/concerns. Patient verbalized understanding and agreeable.       Overall Rating:   How would you rate the care you received while in the hospital? good    CCM referral placed:    No    BOOK BY DATE: 07/08/24

## 2024-06-25 NOTE — DISCHARGE SUMMARY
[facility]     Hamida Atwood Patient Status:  Inpatient    1970 MRN [id number]   Location [facility] 3SW-A Attending No att. providers found   Hosp Day # 1 PCP Tawnya Rockwell DO     Date of Admission:  2024  Date of Discharge:   2024    Discharge Disposition: Home or Self Care    Discharge Diagnosis:    Dog bite induced cellulitis of right hand    History of Present Illness:    Hamida Atwood is a 53 year old female with a past medical history of anxiety/depression and migraines.  She was bitten on the right hand by her dog.  On urgent care  was started on Augmentin.  She says she has felt well.  She comes emergency room secondary to persistent erythema had a fever in the emergency room.       Brief Synopsis:    The patient was admitted due to right hand cellulitis.  She was started on IV ABX with significant improvement.  She was seen by ortho and no surgical intervention was felt to be necessary.  She was stable for DC on PO ABX, recommended warm hand soaking.  She will follow up with her PCP and orhto.     All recommendations and diagnosis' discussed with patient in detail.      Lace+ Score: 31  59-90 High Risk  29-58 Medium Risk  0-28   Low Risk       TCM Follow-Up Recommendation:  LACE 29-58: Moderate Risk of readmission after discharge from the hospital.    Consultants:  ortho    Discharge Medication List:     Discharge Medications        START taking these medications        Instructions Prescription details   amoxicillin clavulanate 875-125 MG Tabs  Commonly known as: Augmentin  Notes to patient: This  is your antibiotic. Make sure to take until all pills are gone.      Take 1 tablet by mouth 2 (two) times daily for 10 days.   Stop taking on: 2024  Quantity: 20 tablet  Refills: 0            CONTINUE taking these medications        Instructions Prescription details   Eletriptan Hydrobromide 20 MG Tabs  Commonly known as: Relpax      Take 1  tablet (20 mg total) by mouth as needed.   Quantity: 9 tablet  Refills: 5     EVENING PRIMROSE OIL OR      Take 1 tablet by mouth daily.   Refills: 0     MULTI-VITAMIN DAILY OR      Take  by mouth.   Refills: 0     OMEGA 3 OR      Take  by mouth.   Refills: 0     tamsulosin 0.4 MG Caps  Commonly known as: Flomax      Take 1 capsule (0.4 mg total) by mouth every evening.   Quantity: 90 capsule  Refills: 0     VITAMIN D-3 OR      Take 1 tablet by mouth daily.   Refills: 0               Where to Get Your Medications        These medications were sent to [place] [id number] [place] [facility] - [address] [phone], [phone]  [address] [facility] [address]      Phone: [phone]   amoxicillin clavulanate 875-125 MG Tabs         [place]    Follow-up appointment:   Tawnya Rockwell DO  [address]  [address] [address]  [facility] [address] [phone]    Schedule an appointment as soon as possible for a visit in 1 week(s)      Chloé Barillas MD  [address] [place] [address] [id number] [phone]    Call  As needed, If symptoms worsen      Vital signs:  Temp:  [98.3 °F (36.8 °C)] 98.3 °F (36.8 °C)  Pulse:  [72] 72  Resp:  [20] 20  BP: (130)/(59) 130/59  SpO2:  [97 %] 97 %    Physical Exam:    General: No acute distress   Lungs: clear to auscultation  Cardiovascular: S1, S2  Abdomen: Soft      -----------------------------------------------------------------------------------------------  PATIENT DISCHARGE INSTRUCTIONS: See electronic chart    Darryn Spencer MD    Total minutes spent on discharge plannin      The  Cent[employer] Cures Act makes medical notes like these available to patients in the interest of transparency. Please be advised this is a medical document. Medical documents are intended to carry relevant information, facts as evident, and the clinical opinion of the practitioner. The medical note is intended as peer to peer communication and may appear blunt or direct. It is  written in medical language and may contain abbreviations or verbiage that are unfamiliar.

## 2024-06-26 NOTE — PAYOR COMM NOTE
--------------  DISCHARGE REVIEW    Payor: [facility] PPO  Subscriber #:  [id number]  Authorization Number: [id number]    Admit date: 24  Admit time:   2:29 PM  Discharge Date: 2024  5:36 PM     Admitting Physician: Darryn Spencer MD  Attending Physician:  No att. providers found  Primary Care Physician: Tawnya Rockwell DO          Discharge Summary Notes        Discharge Summary signed by Darryn Spencer MD at 2024  9:21 AM       Author: Darryn Spencer MD Specialty: HOSPITALIST, Internal Medicine Author Type: Physician    Filed: 2024  9:21 AM Date of Service: 2024  9:19 AM Status: Signed    : Darryn Spencer MD (Physician)           [facility]     Hamida Atwood Patient Status:  Inpatient    1970 MRN [id number]   Location [facility] 3SW-A Attending No att. providers found   Hosp Day # 1 PCP Tawnya Rockwell DO     Date of Admission:  2024  Date of Discharge:   2024    Discharge Disposition: Home or Self Care    Discharge Diagnosis:    Dog bite induced cellulitis of right hand    History of Present Illness:    Hamida Atwood is a 53 year old female with a past medical history of anxiety/depression and migraines.  She was bitten on the right hand by her dog.  On urgent care  was started on Augmentin.  She says she has felt well.  She comes emergency room secondary to persistent erythema had a fever in the emergency room.       Brief Synopsis:    The patient was admitted due to right hand cellulitis.  She was started on IV ABX with significant improvement.  She was seen by ortho and no surgical intervention was felt to be necessary.  She was stable for DC on PO ABX, recommended warm hand soaking.  She will follow up with her PCP and orhto.     All recommendations and diagnosis' discussed with patient in detail.      Lace+ Score: 31  59-90 High Risk  29-58 Medium Risk  0-28   Low Risk       TCM Follow-Up Recommendation:  LACE 29-58:  Moderate Risk of readmission after discharge from the hospital.    Consultants:  ortho    Discharge Medication List:     Discharge Medications        START taking these medications        Instructions Prescription details   amoxicillin clavulanate 875-125 MG Tabs  Commonly known as: Augmentin  Notes to patient: This  is your antibiotic. Make sure to take until all pills are gone.      Take 1 tablet by mouth 2 (two) times daily for 10 days.   Stop taking on: July 4, 2024  Quantity: 20 tablet  Refills: 0            CONTINUE taking these medications        Instructions Prescription details   Eletriptan Hydrobromide 20 MG Tabs  Commonly known as: Relpax      Take 1 tablet (20 mg total) by mouth as needed.   Quantity: 9 tablet  Refills: 5     EVENING PRIMROSE OIL OR      Take 1 tablet by mouth daily.   Refills: 0     MULTI-VITAMIN DAILY OR      Take  by mouth.   Refills: 0     OMEGA 3 OR      Take  by mouth.   Refills: 0     tamsulosin 0.4 MG Caps  Commonly known as: Flomax      Take 1 capsule (0.4 mg total) by mouth every evening.   Quantity: 90 capsule  Refills: 0     VITAMIN D-3 OR      Take 1 tablet by mouth daily.   Refills: 0               Where to Get Your Medications        These medications were sent to [place] [address] [place] [facility] [address] [facility] [phone], [phone]  [address] [facility] [address]      Phone: [phone]   amoxicillin clavulanate 875-125 MG Tabs         [place]    Follow-up appointment:   Tawnya Rockwell DO  [address]  [place] [address]  [facility] [address] [phone]    Schedule an appointment as soon as possible for a visit in 1 week(s)      Chloé Barillas MD  [address] [place] [address] [phone]    Call  As needed, If symptoms worsen      Vital signs:  Temp:  [98.3 °F (36.8 °C)] 98.3 °F (36.8 °C)  Pulse:  [72] 72  Resp:  [20] 20  BP: (130)/(59) 130/59  SpO2:  [97 %] 97 %    Physical Exam:    General: No acute distress   Lungs: clear to  auscultation  Cardiovascular: S1, S2  Abdomen: Soft      -----------------------------------------------------------------------------------------------  PATIENT DISCHARGE INSTRUCTIONS: See electronic chart    Darryn Spencer MD    Total minutes spent on discharge plannin      The  Century Cures Act makes medical notes like these available to patients in the interest of transparency. Please be advised this is a medical document. Medical documents are intended to carry relevant information, facts as evident, and the clinical opinion of the practitioner. The medical note is intended as peer to peer communication and may appear blunt or direct. It is written in medical language and may contain abbreviations or verbiage that are unfamiliar.     Electronically signed by Darryn Spencer MD on 2024  9:21 AM         REVIEWER COMMENTS

## 2024-06-28 ENCOUNTER — OFFICE VISIT (OUTPATIENT)
Dept: FAMILY MEDICINE CLINIC | Facility: CLINIC | Age: 54
End: 2024-06-28

## 2024-06-28 VITALS
BODY MASS INDEX: 37.22 KG/M2 | HEART RATE: 72 BPM | WEIGHT: 218 LBS | OXYGEN SATURATION: 98 % | RESPIRATION RATE: 16 BRPM | SYSTOLIC BLOOD PRESSURE: 114 MMHG | DIASTOLIC BLOOD PRESSURE: 68 MMHG | TEMPERATURE: 98 F | HEIGHT: 64 IN

## 2024-06-28 DIAGNOSIS — M25.641 FINGER STIFFNESS, RIGHT: Primary | ICD-10-CM

## 2024-06-28 PROCEDURE — 3078F DIAST BP <80 MM HG: CPT | Performed by: FAMILY MEDICINE

## 2024-06-28 PROCEDURE — 99496 TRANSJ CARE MGMT HIGH F2F 7D: CPT | Performed by: FAMILY MEDICINE

## 2024-06-28 PROCEDURE — 3008F BODY MASS INDEX DOCD: CPT | Performed by: FAMILY MEDICINE

## 2024-06-28 PROCEDURE — 3074F SYST BP LT 130 MM HG: CPT | Performed by: FAMILY MEDICINE

## 2024-06-28 NOTE — PROGRESS NOTES
Subjective:   Hamida Atwood is a 53 year old female who presents for hospital follow up.   She was discharged from [facility] to Home or Self Care  Admission Date: 6/23/24   Discharge Date: 6/24/24  Hospital Discharge Diagnosis: Dog bite; cellulitis    Interactive contact within 2 business days post discharge first initiated on Date: 6/25/2024    During the visit, the following was completed:  Obtained and reviewed discharge summary, continuity of care documents, and Hospitalist notes  Reviewed Labs (CBC, CMP)    HPI:   53-year-old female with a past medical history of obesity, GERD presenting today following right hand cellulitis admission.  She was admitted overnight for a dog bite this was on 6/21/2024.  She went to the immediate care and was given Augmentin.  The following day she went to the emergency room due to lack of improvement in symptoms.  She was placed on IV antibiotics with improvement.  She was also evaluated by orthopedic who recommended completing outpatient course of antibiotics and OT if stiffness is still persistent in her finger.  She was discharged on 6/25/2024.  She is on Augmentin for a full 10-day course.  Patient is compliant with medication right now.  Denies any fever, swelling or redness.  Pain has improved quite well.  There is some stiffness at the base of the middle finger to flexion and extension.  Her dog is vaccinated.  Tdap was given on June 22.    XR HAND (MIN 3 VIEWS), RIGHT (CPT=73130)    Result Date: 6/23/2024  PROCEDURE:  XR HAND (MIN 3 VIEWS), RIGHT (CPT=73130)  TECHNIQUE:  Three views of the right hand were obtained.  COMPARISON:  None.  INDICATIONS:  dog bite  PATIENT STATED HISTORY: (As transcribed by Technologist)  Patient states she has a dog bite and now has right hand abrasions as well as missing a fingernail on her third digit.   FINDINGS:  No fracture or dislocation.  Joint spaces are relatively well maintained.  No bone abnormality.  IMPRESSION:  Unremarkable  radiographs of the right hand.   LOCATION:  [facility]   Dictated by (CST): Raheem Still MD on 6/23/2024 at 1:43 PM     Finalized by (CST): Raheem Still MD on 6/23/2024 at 1:44 PM        History/Other:   Current Medications:  Medication Reconciliation:  I am aware of an inpatient discharge within the last 30 days.  The discharge medication list has been reconciled with the patient's current medication list and reviewed by me.  See medication list for additions of new medication, and changes to current doses of medications and discontinued medications.  Outpatient Medications Marked as Taking for the 6/28/24 encounter (Office Visit) with Tawnya Rockwell,    Medication Sig    amoxicillin clavulanate 875-125 MG Oral Tab Take 1 tablet by mouth 2 (two) times daily for 10 days.    tamsulosin 0.4 MG Oral Cap Take 1 capsule (0.4 mg total) by mouth every evening.    Eletriptan Hydrobromide (RELPAX) 20 MG Oral Tab Take 1 tablet (20 mg total) by mouth as needed.    EVENING PRIMROSE OIL OR Take 1 tablet by mouth daily.    Cholecalciferol (VITAMIN D-3 OR) Take 1 tablet by mouth daily.    Multiple Vitamin (MULTI-VITAMIN DAILY OR) Take  by mouth.    Linolenic Acid (OMEGA 3 OR) Take  by mouth.       Review of Systems:  GENERAL: weight stable, energy stable, no sweating  LUNGS: denies shortness of breath with exertion  CARDIOVASCULAR: denies chest pain on exertion or palpitations      Objective:   Patient's last menstrual period was 08/01/2022.  Estimated body mass index is 37.42 kg/m² as calculated from the following:    Height as of this encounter: 5' 4\" (1.626 m).    Weight as of this encounter: 218 lb (98.9 kg).   /68   Pulse 72   Temp 98 °F (36.7 °C) (Temporal)   Resp 16   Ht 5' 4\" (1.626 m)   Wt 218 lb (98.9 kg)   LMP 08/01/2022   SpO2 98%   BMI 37.42 kg/m²    GENERAL: well developed, well nourished, in no apparent distress  HEENT: atraumatic, normocephalic  NEURO: Oriented times three, cranial nerves  are intact, motor and sensory are grossly intact    Physical Exam  Musculoskeletal:        Hands:      Right hand with marked areas of scabbing, no open wound.  Healing appropriately.  Right hand fingers with no evidence of deformity.  Normal flexion and extension of right hand digits.  Particularly the third digit normal flexion extension in the interphalangeal joints.  Trace erythema.  No edema.  Missing nail right middle finger.    Assessment & Plan:   1. Finger stiffness, right (Primary)  -     Occupational Therapy Referral - [facility] Location    Patient instructed to continue with current plan of care.  Complete full course of Augmentin.  May continue to do soaks as recommended by Ortho.  Notify if there is recurrence of swelling or redness.  May start occupational therapy in 1 to 2 weeks if the stiffness does not improve.  At this time I do not think she needs to see orthopedic but if her symptoms do not continue to improve we will have her see Ortho.    Return if symptoms worsen or fail to improve.

## 2024-07-24 ENCOUNTER — OFFICE VISIT (OUTPATIENT)
Dept: FAMILY MEDICINE CLINIC | Facility: CLINIC | Age: 54
End: 2024-07-24
Payer: COMMERCIAL

## 2024-07-24 VITALS
HEART RATE: 95 BPM | OXYGEN SATURATION: 96 % | BODY MASS INDEX: 37.56 KG/M2 | SYSTOLIC BLOOD PRESSURE: 120 MMHG | TEMPERATURE: 99 F | RESPIRATION RATE: 18 BRPM | WEIGHT: 220 LBS | DIASTOLIC BLOOD PRESSURE: 70 MMHG | HEIGHT: 64 IN

## 2024-07-24 DIAGNOSIS — U07.1 COVID-19: Primary | ICD-10-CM

## 2024-07-24 LAB
OPERATOR ID: ABNORMAL
POCT LOT NUMBER: ABNORMAL
RAPID SARS-COV-2 BY PCR: DETECTED

## 2024-07-24 PROCEDURE — 3008F BODY MASS INDEX DOCD: CPT | Performed by: NURSE PRACTITIONER

## 2024-07-24 PROCEDURE — 3078F DIAST BP <80 MM HG: CPT | Performed by: NURSE PRACTITIONER

## 2024-07-24 PROCEDURE — 99213 OFFICE O/P EST LOW 20 MIN: CPT | Performed by: NURSE PRACTITIONER

## 2024-07-24 PROCEDURE — U0002 COVID-19 LAB TEST NON-CDC: HCPCS | Performed by: NURSE PRACTITIONER

## 2024-07-24 PROCEDURE — 3074F SYST BP LT 130 MM HG: CPT | Performed by: NURSE PRACTITIONER

## 2024-07-24 NOTE — PROGRESS NOTES
CHIEF COMPLAINT:     Chief Complaint   Patient presents with    Nasal Congestion    Cough       HPI:   Hamida Atwood is a 53 year old female who presents for nasal congestion/runny nose and cough. Patient reports symptoms present x 3 days. Notes low grade fevers.  Denies any wheezing, chest discomfort, or shortness of breath. .  Treating symptoms with otc cold meds.   Tolerates PO well at home. No n/v/d.  Denies any other aggravating or relieving factors at home. Denies any other treatment attempts prior to arrival.  Denies known covid-19 or strep exposure.     Current Outpatient Medications   Medication Sig Dispense Refill    tamsulosin 0.4 MG Oral Cap Take 1 capsule (0.4 mg total) by mouth every evening. 90 capsule 0    Eletriptan Hydrobromide (RELPAX) 20 MG Oral Tab Take 1 tablet (20 mg total) by mouth as needed. 9 tablet 5    EVENING PRIMROSE OIL OR Take 1 tablet by mouth daily.      Cholecalciferol (VITAMIN D-3 OR) Take 1 tablet by mouth daily.      Multiple Vitamin (MULTI-VITAMIN DAILY OR) Take  by mouth.      Linolenic Acid (OMEGA 3 OR) Take  by mouth.        Past Medical History:    Abdominal wall lump    right upper quadrant under right ribs at mid clavicular line.     Abnormal mammogram    Anxiety and depression    Atopic dermatitis    Breast density    asymmetric nodular density in the R breast    Chronic knee pain    Class 2 obesity due to excess calories without serious comorbidity in adult    Decorative tattoo    Right leg above ankle    Fatigue    Due to lack of sleep    Flatulence/gas pain/belching    More as I get older    Headache disorder    Migraines. 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    Lumbago    chronic    Migraines    Obesity    Prediabetes    Sleep disturbance    Insomnia related to pre-menopause    Stress    Stress reaction    Vitamin D deficiency      Past  Surgical History:   Procedure Laterality Date    Anesthesia for;  procedures      Breast surgery procedure unlisted      breast lump removed, benign finding    Colonoscopy  2020    1 polyp removed    Verito localization wire 1 site right (cpt=19281)            Tubal ligation           Social History     Socioeconomic History    Marital status:    Tobacco Use    Smoking status: Former     Current packs/day: 0.00     Types: Cigarettes     Quit date: 2010     Years since quittin.4     Passive exposure: Past    Smokeless tobacco: Never   Vaping Use    Vaping status: Never Used   Substance and Sexual Activity    Alcohol use: Yes     Comment: 1-2 per month    Drug use: No    Sexual activity: Yes     Partners: Male   Other Topics Concern    Caffeine Concern No    Sleep Concern No    Stress Concern No    Weight Concern No    Special Diet No    Exercise No    Seat Belt No    Self-Exams No     Social Determinants of Health     Financial Resource Strain: Low Risk  (2024)    Financial Resource Strain     Difficulty of Paying Living Expenses: Not hard at all   Food Insecurity: No Food Insecurity (2024)    Food Insecurity     Food Insecurity: Never true   Transportation Needs: No Transportation Needs (2024)    Transportation Needs     Lack of Transportation: No   Housing Stability: Low Risk  (2024)    Housing Stability     Housing Instability: No         REVIEW OF SYSTEMS:   GENERAL: + low grade fevers. Notes good appetite  SKIN: no rashes or abnormal skin lesions  HEENT: Denies sore throat, + sinus symptoms, Denies ear pain  LUNGS: + nonproductive cough, denies shortness of breath or wheezing,   CARDIOVASCULAR: denies chest pain or palpitations   GI: denies N/V/C or abdominal pain  NEURO: Denies headaches    EXAM:   /70   Pulse 95   Temp 99.3 °F (37.4 °C)   Resp 18   Ht 5' 4\" (1.626 m)   Wt 220 lb (99.8 kg)   LMP 2022   SpO2 96%   BMI 37.76  kg/m²   GENERAL: well developed, well nourished,in no apparent distress  SKIN: no rashes,no suspicious lesions  HEAD: atraumatic, normocephalic.    EYES: conjunctiva clear  EARS: TM's intact and without erythema, no bulging, no retraction,no fluid, bony landmarks visualized. No erythema or swelling noted to ear canals or external ears.   NOSE: Nostrils patent, clear nasal discharge, nasal mucosa reddened   THROAT: Oral mucosa pink, moist. Posterior pharynx is not erythematous. No exudates. No tonsillar hypertrophy noted.  No trismus. Uvula midline with no swelling. Voice clear/normal. No stridor  NECK: Supple, non-tender  LUNGS: clear to auscultation bilaterally, no rales, wheezes or rhonchi. Breathing is non labored.  CARDIO: RRR without murmur  EXTREMITIES: no cyanosis, clubbing or edema  LYMPH:  No lymphadenopathy.        ASSESSMENT AND PLAN:       ICD-10-CM    1. COVID-19  U07.1 COVID-19 LAB TEST NON-CDC          Covid-19 test is positive.    Discussed physical exam and hpi with pt. No bacterial focus noted on exam. Pt has reassuring physical exam consistent with mild viral uri symptoms. Lungs clear bilat. No respiratory distress noted.  Treatment options discussed with patient and explained in detail. We reviewed symptomatic care at home. Pt declines antiviral therapy. The risks, benefits and potential side effects of possible medications were reviewed. Alternatives were discussed. Monitoring parameters and expected course outlined. We reviewed self quarantine guidelines. Patient to call PCP or go to emergency department if symptoms fail to respond as outlined, or worsen in any way. The patient agreed with the plan.      Patient Instructions   1. Rest. Drink plenty of fluids.     2. Supportive care as discussed.     3. Covid-19 test is positive. Self quarantine at this time.   Home isolation until:  Your symptoms are improving AND  You are fever free for 24 hours without using fever reducing medications  Resume  normal activities, and use added prevention strategies over the next five days.  Clean your hands often  wear a well-fitting mask when around others  keep a distance from others    4. Follow up with PMD in 4-5 days for re-eval. Go to the emergency department immediately if symptoms worsen, change, you develop chest discomfort, wheezing, shortness of breath, or if you have any concerns.

## 2024-07-24 NOTE — PATIENT INSTRUCTIONS
1. Rest. Drink plenty of fluids.     2. Supportive care as discussed.     3. Covid-19 test is positive. Self quarantine at this time.   Home isolation until:  Your symptoms are improving AND  You are fever free for 24 hours without using fever reducing medications  Resume normal activities, and use added prevention strategies over the next five days.  Clean your hands often  wear a well-fitting mask when around others  keep a distance from others    4. Follow up with PMD in 4-5 days for re-eval. Go to the emergency department immediately if symptoms worsen, change, you develop chest discomfort, wheezing, shortness of breath, or if you have any concerns.

## 2024-09-05 ENCOUNTER — OFFICE VISIT (OUTPATIENT)
Dept: FAMILY MEDICINE CLINIC | Facility: CLINIC | Age: 54
End: 2024-09-05
Payer: COMMERCIAL

## 2024-09-05 VITALS
HEART RATE: 77 BPM | OXYGEN SATURATION: 95 % | DIASTOLIC BLOOD PRESSURE: 74 MMHG | HEIGHT: 64 IN | SYSTOLIC BLOOD PRESSURE: 100 MMHG | TEMPERATURE: 98 F | WEIGHT: 224 LBS | RESPIRATION RATE: 16 BRPM | BODY MASS INDEX: 38.24 KG/M2

## 2024-09-05 DIAGNOSIS — G43.009 MIGRAINE WITHOUT AURA AND WITHOUT STATUS MIGRAINOSUS, NOT INTRACTABLE: ICD-10-CM

## 2024-09-05 DIAGNOSIS — Z00.00 WELL ADULT EXAM: Primary | ICD-10-CM

## 2024-09-05 DIAGNOSIS — R73.03 PREDIABETES: ICD-10-CM

## 2024-09-05 DIAGNOSIS — K21.9 GASTROESOPHAGEAL REFLUX DISEASE WITHOUT ESOPHAGITIS: ICD-10-CM

## 2024-09-05 PROCEDURE — 3078F DIAST BP <80 MM HG: CPT | Performed by: FAMILY MEDICINE

## 2024-09-05 PROCEDURE — 99396 PREV VISIT EST AGE 40-64: CPT | Performed by: FAMILY MEDICINE

## 2024-09-05 PROCEDURE — 3074F SYST BP LT 130 MM HG: CPT | Performed by: FAMILY MEDICINE

## 2024-09-05 PROCEDURE — 3008F BODY MASS INDEX DOCD: CPT | Performed by: FAMILY MEDICINE

## 2024-09-05 NOTE — PROGRESS NOTES
Chief Complaint   Patient presents with    Well Adult     Note to patient: The 21st Century Cures Act makes medical notes like these available to patients in the interest of transparency. However, be advised this is a medical document. It is intended as peer to peer communication. It is written in medical language and may contain abbreviations or verbiage that are unfamiliar. It may appear blunt or direct. Medical documents are intended to carry relevant information, facts as evident, and the clinical opinion of the practitioner.     HPI:    Patient is a 54 year old female here for physical exam.   She is postmenopausal.     She has hx of migraines. She is on eletriptan. She has not had a migraine since a year. She has the triptan script but does not use it.     She has occasional heartburn- manages with diet.     No breast or colon Ca in family.     Hx of kidney stones. Passed on it's own, followed GI for this.     PSHx: Tubal ligation   FMHx:  father- non-Hodgkin lymphoma, kidney stones   Smoking: none  Alcohol: occasionally   Drugs: none   Sexual hx: 1 partner   STD hx: declined  Occupation:  for [employer]  Mammogram: jan 2024- birads 2     Colonoscopy: 2020- due in 2025.   EGD utd     Pap smear: 2020- negative- repeat 5 years  Tdap: 2016    Wt Readings from Last 6 Encounters:   09/05/24 224 lb (101.6 kg)   07/24/24 220 lb (99.8 kg)   06/28/24 218 lb (98.9 kg)   06/23/24 218 lb (98.9 kg)   06/23/24 218 lb (98.9 kg)   12/18/23 215 lb (97.5 kg)         Review of Systems   Constitutional: Negative for fever, chills and fatigue. No distress.  HENT: Negative for hearing loss, congestion, sore throat, neck pain   Eyes: Negative for pain and visual disturbance.   Respiratory: Negative for cough, chest tightness, shortness of breath and wheezing.    Cardiovascular: Negative for chest pain, palpitations and leg swelling.   Gastrointestinal: Negative for nausea, vomiting, abdominal pain, diarrhea, blood in  stool and abdominal distention.   Genitourinary: Negative for dysuria, hematuria and difficulty urinating.     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     Abnormal mammogram    Anxiety and depression    Atopic dermatitis    Breast density    asymmetric nodular density in the R breast    Chronic knee pain    Class 2 obesity due to excess calories without serious comorbidity in adult    Decorative tattoo    Right leg above ankle    Fatigue    Due to lack of sleep    Flatulence/gas pain/belching    More as I get older    Headache disorder    Migraines. 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    Lumbago    chronic    Migraines    Obesity    Prediabetes    Sleep disturbance    Insomnia related to pre-menopause    Stress    Stress reaction    Vitamin D deficiency     Past Surgical History:   Procedure Laterality Date    Anesthesia for;  procedures      Breast surgery procedure unlisted      breast lump removed, benign finding    Colonoscopy  2020    1 polyp removed    Verito localization wire 1 site right (cpt=19281)            Tubal ligation       Family History   Problem Relation Age of Onset    Other (Kidney stones) Father     Colon Polyps Father     Cancer Father         Mycosis fungoides    Allergies Mother         hayfever    Heart Attack Maternal Grandmother     Lung Disorder Maternal Grandfather     Stroke Paternal Grandmother     Psychiatric Paternal  Grandfather     Other (Kidney stones) Brother     Other (Kidney stones) Other         uncles    Allergies Sister         hayfever    Ovarian Cancer Maternal Aunt     Breast Cancer Neg     Colon Cancer Neg     Ovarian Cancer Neg      Social History     Socioeconomic History    Marital status:    Tobacco Use    Smoking status: Former     Current packs/day: 0.00     Types: Cigarettes     Quit date: 2010     Years since quittin.5     Passive exposure: Past    Smokeless tobacco: Never   Vaping Use    Vaping status: Never Used   Substance and Sexual Activity    Alcohol use: Yes     Comment: 1-2 per month    Drug use: No    Sexual activity: Yes     Partners: Male   Other Topics Concern    Caffeine Concern No    Sleep Concern No    Stress Concern No    Weight Concern No    Special Diet No    Exercise No    Seat Belt No    Self-Exams No     Social Determinants of Health     Financial Resource Strain: Low Risk  (2024)    Financial Resource Strain     Difficulty of Paying Living Expenses: Not hard at all   Food Insecurity: No Food Insecurity (2024)    Food Insecurity     Food Insecurity: Never true   Transportation Needs: No Transportation Needs (2024)    Transportation Needs     Lack of Transportation: No   Housing Stability: Low Risk  (2024)    Housing Stability     Housing Instability: No       Current Outpatient Medications   Medication Sig Dispense Refill    Eletriptan Hydrobromide (RELPAX) 20 MG Oral Tab Take 1 tablet (20 mg total) by mouth as needed. 9 tablet 5    EVENING PRIMROSE OIL OR Take 1 tablet by mouth daily.      Cholecalciferol (VITAMIN D-3 OR) Take 1 tablet by mouth daily.      Multiple Vitamin (MULTI-VITAMIN DAILY OR) Take  by mouth.      Linolenic Acid (OMEGA 3 OR) Take  by mouth.      tamsulosin 0.4 MG Oral Cap Take 1 capsule (0.4 mg total) by mouth every evening. 90 capsule 0       Allergies  Allergies   Allergen Reactions    Neomycin SWELLING     Ear swollen, severe  rash       Health Maintenance  Immunizations:  Immunization History   Administered Date(s) Administered    Covid-19 Vaccine Pfizer 30 mcg/0.3 ml 04/10/2021, 05/01/2021, 12/27/2021    TDAP 08/17/2016, 06/22/2024   Deferred Date(s) Deferred    Influenza Vaccine Refused 09/28/2021, 10/10/2023    Zoster Vaccine Recombinant Adjuvanted (Shingrix) 09/05/2024         Physical Exam  /74   Pulse 77   Temp 97.7 °F (36.5 °C) (Temporal)   Resp 16   Ht 5' 4\" (1.626 m)   Wt 224 lb (101.6 kg)   LMP 08/01/2022   SpO2 95%   BMI 38.45 kg/m²   Constitutional: Oriented to person, place, and time. No distress.   HEENT:  Normocephalic and atraumatic. Hearing and tympanic membranes normal.  Nose normal. Oropharynx is clear and moist.   Eyes: Conjunctivae and EOM are normal. PERRLA. No scleral icterus.   Neck:  No mass and no thyromegaly present.   Cardiovascular: Normal rate, regular rhythm and intact distal pulses.  No murmur, rubs or gallops.   Pulmonary/Chest: Effort normal and breath sounds normal. No respiratory distress. No wheezes, rhonchi or rales  Abdominal: Soft. Bowel sounds are normal. Non tender, no masses, no organomegaly or hernias.  Musculoskeletal: No edema and no tenderness.    Neurological: grossly normal    Psychiatric: Normal mood and affect.     A/P:      Encounter Diagnoses   Name Primary?    Well adult exam Yes    Prediabetes     Gastroesophageal reflux disease without esophagitis     Migraine without aura and without status migrainosus, not intractable      1. Well adult exam  - -Discussed diet and exercise, counseled on vaccine and screening guidelines.   - CBC With Differential With Platelet; Future  - Comp Metabolic Panel (14); Future  - Lipid Panel; Future    2. Prediabetes  Last A1c value was 6.1% done 1/10/2024.  Advised on diet/exercise   - Hemoglobin A1C; Future    3. Gastroesophageal reflux disease without esophagitis  Diet controlled     4. Migraine without aura and without status  migrainosus, not intractable  Mostly resolved. She has rx for eletriptan if needed. Has not had migraines in years.     Orders Placed This Encounter   Procedures    CBC With Differential With Platelet    Comp Metabolic Panel (14)    Lipid Panel    Hemoglobin A1C       Meds & Refills for this Visit:  Requested Prescriptions      No prescriptions requested or ordered in this encounter       Imaging & Consults:  None      No follow-ups on file.    There are no Patient Instructions on file for this visit.    All questions were answered and the patient understands the plan.     NAYELI Saenz      I have personally seen and examined the patient, I have reviewed the physician assistants examination, notes, and agree with the assessment and plan.      Tawnya Rockwell,         This note was prepared using Dragon Medical voice recognition dictation software. As a result errors may occur. When identified these errors have been corrected. While every attempt is made to correct errors during dictation discrepancies may still exist.      Note to patient: The 21st Century Cures Act makes medical notes like these available to patients in the interest of transparency. However, be advised this is a medical document. It is intended as peer to peer communication. It is written in medical language and may contain abbreviations or verbiage that are unfamiliar. It may appear blunt or direct. Medical documents are intended to carry relevant information, facts as evident, and the clinical opinion of the practitioner.

## 2024-10-05 ENCOUNTER — TELEMEDICINE (OUTPATIENT)
Dept: FAMILY MEDICINE CLINIC | Facility: CLINIC | Age: 54
End: 2024-10-05
Payer: COMMERCIAL

## 2024-10-05 DIAGNOSIS — L71.0 PERIORAL DERMATITIS: Primary | ICD-10-CM

## 2024-10-05 PROCEDURE — 99213 OFFICE O/P EST LOW 20 MIN: CPT | Performed by: FAMILY MEDICINE

## 2024-10-05 RX ORDER — MINOCYCLINE HYDROCHLORIDE 100 MG/1
100 TABLET ORAL 2 TIMES DAILY
Qty: 14 TABLET | Refills: 2 | Status: SHIPPED | OUTPATIENT
Start: 2024-10-05 | End: 2024-10-05

## 2024-10-05 RX ORDER — MINOCYCLINE HYDROCHLORIDE 100 MG/1
100 CAPSULE ORAL 2 TIMES DAILY
Qty: 14 CAPSULE | Refills: 3 | Status: SHIPPED | OUTPATIENT
Start: 2024-10-05 | End: 2024-10-12

## 2024-10-05 NOTE — PROGRESS NOTES
Subjective    This visit is conducted using Telemedicine with live, interactive video and audio.    Chief Complaint:  Chief Complaint   Patient presents with    Medication Follow-Up         The patient confirmed knowledge of the limitations of the use of telemedicine were verbally confirmed by the provider.  Verification of patient identity was established.  Patient understands and accepts financial responsibility for any deductible, co-insurance and/or co-pays associated with this service.      HPI:  Patient has a hx of perioral dermatitis.  It flares during season change, started at age 17. She applies hydrocortisone as needed.   She was given script for 1 week of minocycline 100mg bid by her previous pcp.  Requesting refill on this. She currently has a flare starting on the right perioral area.        Review of Systems   Constitutional: Negative for fever, chills and fatigue.   HENT: Negative for hearing loss, congestion, sore throat   Respiratory: Negative for cough, chest tightness, shortness of breath and wheezing.    Cardiovascular: Negative for chest pain, palpitations   Skin: + perioral rash.     HISTORY:  Past Medical History:    Abdominal wall lump    right upper quadrant under right ribs at mid clavicular line.     Abnormal mammogram    Anxiety and depression    Atopic dermatitis    Breast density    asymmetric nodular density in the R breast    Chronic knee pain    Class 2 obesity due to excess calories without serious comorbidity in adult    Decorative tattoo    Right leg above ankle    Fatigue    Due to lack of sleep    Flatulence/gas pain/belching    More as I get older    Headache disorder    Migraines. 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    Lumbago    chronic    Migraines    Obesity    Prediabetes    Sleep disturbance    Insomnia related to pre-menopause    Stress     Stress reaction    Vitamin D deficiency      Past Surgical History:   Procedure Laterality Date    Anesthesia for;  procedures      Breast surgery procedure unlisted      breast lump removed, benign finding    Colonoscopy  2020    1 polyp removed    Verito localization wire 1 site right (cpt=19281)            Tubal ligation        Family History   Problem Relation Age of Onset    Other (Kidney stones) Father     Colon Polyps Father     Cancer Father         Mycosis fungoides    Allergies Mother         hayfever    Heart Attack Maternal Grandmother     Lung Disorder Maternal Grandfather     Stroke Paternal Grandmother     Psychiatric Paternal Grandfather     Other (Kidney stones) Brother     Other (Kidney stones) Other         uncles    Allergies Sister         hayfever    Ovarian Cancer Maternal Aunt     Breast Cancer Neg     Colon Cancer Neg     Ovarian Cancer Neg       Social History     Socioeconomic History    Marital status:    Tobacco Use    Smoking status: Former     Current packs/day: 0.00     Types: Cigarettes     Quit date: 2010     Years since quittin.6     Passive exposure: Past    Smokeless tobacco: Never   Vaping Use    Vaping status: Never Used   Substance and Sexual Activity    Alcohol use: Yes     Comment: 1-2 per month    Drug use: No    Sexual activity: Yes     Partners: Male   Other Topics Concern    Caffeine Concern No    Sleep Concern No    Stress Concern No    Weight Concern No    Special Diet No    Exercise No    Seat Belt No    Self-Exams No     Social Determinants of Health     Financial Resource Strain: Low Risk  (2024)    Financial Resource Strain     Difficulty of Paying Living Expenses: Not hard at all   Food Insecurity: No Food Insecurity (2024)    Food Insecurity     Food Insecurity: Never true   Transportation Needs: No Transportation Needs (2024)    Transportation Needs     Lack of Transportation: No   Housing  Stability: Low Risk  (6/23/2024)    Housing Stability     Housing Instability: No              Objective    Physical Exam:  alert, appears stated age, and cooperative, Speaking in full sentences comfortably, Normal work of breathing, and perioral area shows redness around the right lower lip      Assessment/Plan:    1. Perioral dermatitis  Rx sent for minocycle to be used for one week. Recommended against fragrance skin products. Avoid using HC for more than 5-7 days at a time. Use skin protectant.   - minocycline 100 MG Oral Cap; Take 1 capsule (100 mg total) by mouth 2 (two) times daily for 7 days.  Dispense: 14 capsule; Refill: 3      Diagnostic rationale, follow up instructions, and strict precautions/indications for emergent direct evaluation were discussed with the patient. The patient agrees with the plan, and understands to follow up with their primary care physician or other healthcare provider within 48-72 hours for reevaluation for persistent or worsening symptoms.    Patient understands phone evaluation is not a substitute for face-to-face examination or emergency care. Patient advised to go to ER or call 911 for worsening symptoms or acute distress.         aTwnya Rockwell DO

## (undated) NOTE — MR AVS SNAPSHOT
[facility] [address] [place] [address] [address] [facility]  [phone]  [phone]               Thank you for choosing us for your health care visit with Marion Coronel MD.  We are glad to serve you and happy to provide you wit visit,  view other health information, and more. To sign up or find more information, go to [email or web address] and click on the Sign Up Now link in the [employer] box.      Enter your [employer] Activation Code exactly as it appears below along with yo Don’t forget strength training with weights and resistance Set goals and track your progress   You don’t need to join a gym. Home exercises work great.  Put more priority on exercise in your life                    Visit [facility] online at

## (undated) NOTE — Clinical Note
JOSÉ MIGUEL Rey completed TCM. Patient has upcoming TCM/HFU appointment scheduled on 06/28/24. Thank you.